# Patient Record
Sex: FEMALE | Race: BLACK OR AFRICAN AMERICAN | NOT HISPANIC OR LATINO | Employment: FULL TIME | URBAN - METROPOLITAN AREA
[De-identification: names, ages, dates, MRNs, and addresses within clinical notes are randomized per-mention and may not be internally consistent; named-entity substitution may affect disease eponyms.]

---

## 2020-08-20 ENCOUNTER — OFFICE VISIT (OUTPATIENT)
Dept: FAMILY MEDICINE | Facility: CLINIC | Age: 25
End: 2020-08-20
Payer: COMMERCIAL

## 2020-08-20 VITALS
RESPIRATION RATE: 16 BRPM | OXYGEN SATURATION: 97 % | TEMPERATURE: 98 F | SYSTOLIC BLOOD PRESSURE: 118 MMHG | DIASTOLIC BLOOD PRESSURE: 82 MMHG | HEART RATE: 84 BPM | BODY MASS INDEX: 27.12 KG/M2 | WEIGHT: 138.13 LBS | HEIGHT: 60 IN

## 2020-08-20 DIAGNOSIS — Z11.59 ENCOUNTER FOR HEPATITIS C SCREENING TEST FOR LOW RISK PATIENT: ICD-10-CM

## 2020-08-20 DIAGNOSIS — Z11.4 ENCOUNTER FOR SCREENING FOR HIV: ICD-10-CM

## 2020-08-20 DIAGNOSIS — L70.0 NODULAR ACNE: ICD-10-CM

## 2020-08-20 DIAGNOSIS — S76.112S QUADRICEPS MUSCLE RUPTURE, LEFT, SEQUELA: ICD-10-CM

## 2020-08-20 DIAGNOSIS — B35.3 TINEA PEDIS, UNSPECIFIED LATERALITY: ICD-10-CM

## 2020-08-20 DIAGNOSIS — R01.1 MURMUR: ICD-10-CM

## 2020-08-20 DIAGNOSIS — Z00.00 HEALTHCARE MAINTENANCE: ICD-10-CM

## 2020-08-20 DIAGNOSIS — R13.19 ESOPHAGEAL DYSPHAGIA: Primary | ICD-10-CM

## 2020-08-20 DIAGNOSIS — F41.1 GENERALIZED ANXIETY DISORDER: ICD-10-CM

## 2020-08-20 PROCEDURE — 99999 PR PBB SHADOW E&M-NEW PATIENT-LVL V: ICD-10-PCS | Mod: PBBFAC,,, | Performed by: INTERNAL MEDICINE

## 2020-08-20 PROCEDURE — 99999 PR PBB SHADOW E&M-NEW PATIENT-LVL V: CPT | Mod: PBBFAC,,, | Performed by: INTERNAL MEDICINE

## 2020-08-20 PROCEDURE — 99204 PR OFFICE/OUTPT VISIT, NEW, LEVL IV, 45-59 MIN: ICD-10-PCS | Mod: S$GLB,,, | Performed by: INTERNAL MEDICINE

## 2020-08-20 PROCEDURE — 99204 OFFICE O/P NEW MOD 45 MIN: CPT | Mod: S$GLB,,, | Performed by: INTERNAL MEDICINE

## 2020-08-20 RX ORDER — PANTOPRAZOLE SODIUM 20 MG/1
20 TABLET, DELAYED RELEASE ORAL DAILY
Qty: 90 TABLET | Refills: 1 | Status: SHIPPED | OUTPATIENT
Start: 2020-08-20 | End: 2021-08-20

## 2020-08-20 RX ORDER — CLINDAMYCIN PHOSPHATE 11.9 MG/ML
SOLUTION TOPICAL 2 TIMES DAILY
Qty: 30 ML | Refills: 3 | Status: SHIPPED | OUTPATIENT
Start: 2020-08-20 | End: 2023-06-08

## 2020-08-20 NOTE — ASSESSMENT & PLAN NOTE
Concerning for esopheal process: unclear if structural or motility, suspect psych component   protonix daily 30d  And ref to gi
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
## 2020-08-25 ENCOUNTER — TELEPHONE (OUTPATIENT)
Dept: FAMILY MEDICINE | Facility: CLINIC | Age: 25
End: 2020-08-25

## 2020-08-25 NOTE — TELEPHONE ENCOUNTER
----- Message from Rebecca Dey MD sent at 8/25/2020 12:53 PM CDT -----  Labs so far look ok  Small amount of blood in urine - is she on her period?  Thyroid number slightly abnormal nothing to do now, continue to monitor

## 2020-08-25 NOTE — TELEPHONE ENCOUNTER
Spoke with patient. Informed patient of recent lab results and Dr Dey's recommendations. Pt verbalizes understanding. Pt states that she was just ending her cycle, when doing the urine test.

## 2020-10-02 ENCOUNTER — TELEPHONE (OUTPATIENT)
Dept: FAMILY MEDICINE | Facility: CLINIC | Age: 25
End: 2020-10-02

## 2020-10-05 ENCOUNTER — OFFICE VISIT (OUTPATIENT)
Dept: DERMATOLOGY | Facility: CLINIC | Age: 25
End: 2020-10-05
Payer: COMMERCIAL

## 2020-10-05 DIAGNOSIS — L70.0 ACNE VULGARIS: ICD-10-CM

## 2020-10-05 DIAGNOSIS — L81.0 POST-INFLAMMATORY HYPERPIGMENTATION: Primary | ICD-10-CM

## 2020-10-05 PROCEDURE — 99999 PR PBB SHADOW E&M-EST. PATIENT-LVL III: CPT | Mod: PBBFAC,,, | Performed by: PHYSICIAN ASSISTANT

## 2020-10-05 PROCEDURE — 99202 OFFICE O/P NEW SF 15 MIN: CPT | Mod: S$GLB,,, | Performed by: PHYSICIAN ASSISTANT

## 2020-10-05 PROCEDURE — 99999 PR PBB SHADOW E&M-EST. PATIENT-LVL III: ICD-10-PCS | Mod: PBBFAC,,, | Performed by: PHYSICIAN ASSISTANT

## 2020-10-05 PROCEDURE — 99202 PR OFFICE/OUTPT VISIT, NEW, LEVL II, 15-29 MIN: ICD-10-PCS | Mod: S$GLB,,, | Performed by: PHYSICIAN ASSISTANT

## 2020-10-05 RX ORDER — TAZAROTENE 0.05 MG/G
CREAM CUTANEOUS
Qty: 30 G | Refills: 2 | Status: SHIPPED | OUTPATIENT
Start: 2020-10-05 | End: 2023-06-08

## 2020-10-05 RX ORDER — DOXYCYCLINE 100 MG/1
TABLET ORAL
Qty: 30 TABLET | Refills: 2 | Status: SHIPPED | OUTPATIENT
Start: 2020-10-05 | End: 2023-06-08

## 2020-10-05 RX ORDER — CLINDAMYCIN PHOSPHATE 10 MG/G
GEL TOPICAL
Qty: 30 G | Refills: 2 | Status: SHIPPED | OUTPATIENT
Start: 2020-10-05 | End: 2023-06-08

## 2020-10-05 NOTE — LETTER
October 5, 2020      Rebecca Dey MD  [address] [facility] [address]           [facility] - Dermatology 11th [address]  [address]  [place] [address]  Phone: [phone]  Fax: [phone]          Patient: Paula Campos   MR Number: [id number]   YOB: 1995   Date of Visit: 10/5/2020       Dear Dr. Rebecca Dey:    Thank you for referring Paula Campos to me for evaluation. Attached you will find relevant portions of my assessment and plan of care.    If you have questions, please do not hesitate to call me. I look forward to following Paula Campos along with you.    Sincerely,    Liz Sky PA-C    Enclosure  CC:  No Recipients    If you would like to receive this communication electronically, please contact [email or web address] or [phone] to request more information on [employer] access.    For providers and/or their staff who would like to refer a patient to [facility], please contact us through our one-stop-shop provider referral line, [facility], at [phone].    If you feel you have received this communication in error or would no longer like to receive these types of communications, please e-mail [email or web address]

## 2020-10-05 NOTE — PATIENT INSTRUCTIONS
Discussed benefits and risks of doxycyline therapy including but not limited to GI discomfort, esophageal irritation/ulceration, and increased sun sensitivity. Patient was counseled to take medicine with meals and at least 1 hour before lying down.     RETINOIDS           Your doctor has prescribed a topical retinoid for your skin. A retinoid is a vitamin A derived product used to treat a variety of skin conditions including acne, actinic keratoses (pre-skin cancers), uneven pigmentation from sun damage, fine lines and wrinkles, and enlarged pores.    How do they work?         Retinoids increase skin cell turn over from the normal 30 days to five or six days, minimizing clogged pores-the major factor in acne. Retinoids can also repair the DNA in cells damaged by the sun helping to even out skin pigmentation and clear pre-skin cancers. They can shrink oil glands and minimize the appearance of large pores. These effects can not be appreciated unless the medication is used on a consistent basis!    How do I use a retinoid?         After washing with a mild cleanser (Purpose, Aqua glycolic face cleanser, Cetaphil, Neutrogena deep cream cleanser), the skin should be moisturized with a non-retinol containing moisturizer such as Cerave PM. Then a thin layer of medication is applied to the forehead, nose cheeks, and chin (and around eyes if treating fine lines and wrinkles) at night. The amount of medication needed to cover the entire face should be no more than the size of a green pea. Irritation around the eyes can be treated with Vaseline at night.    What if my skin appears dry, red, and is peeling?          Retinoids do not cause dry skin but rather they cause the top layer of the skin the shed, giving an appearance of dry skin. In fact, new healthy skin cells are replacing older, damaged cells on the surface. This usually occurs the first 2-4 weeks as the skin is adjusting to the medication. It is reasonable to use  the medication every other night or even every 2 nights until your skin adjusts. You can use a MILD exfoliant to remove the peeling skin (Aveeno daily clarifying pads, Aqua glycolic face cream) and can apply a moisturizer throughout the day as needed. Retinoids come in a variety of strengths and vehicles and your doctor can find one best for you. If you cannot tolerate prescription strength retinoids, over the counter products with retinol may be beneficial. (Olay ProX wrinkle cream, FELIPE deep wrinkle cream, [facility])    Will my skin be more sensitive in the sun?           You will need to use sunscreen with SPF 30 daily. Retinoids will cause the outermost layer of the skin to be thinner and thus more sensitive to ultraviolet rays. However, remember that over time, retinoids actually make the skin thicker by enhancing collagen deposition which protects the skin from sun damage.    When will I see results?            If you are using a retinoid for acne, you should see improvement in 6-8 weeks. Do not be alarmed if you find that your acne gets worse before it gets better-KEEP USING THE MEDICATION- this is a normal response and your acne will improve if you can stick with it.           If you are using the medication for anti-aging and skin dyspigmentation, you may see results in 3 months, but most effects are not visible until 6 months. Retinoids are clinically proven to reverse signs of aging, but only if used on a CONSTISTENT BASIS!             Remember that retinoids should not be used if you are pregnant.           Discontinue use 1 week prior to waxing, as skin is more likely to tear.

## 2020-10-05 NOTE — PROGRESS NOTES
Subjective:       Patient ID:  Paula Campos is a 24 y.o. female who presents for   Chief Complaint   Patient presents with    Acne     Face, x 5yrs, hyperpigmented, tx otc product    Skin Discoloration     groin and back, x 10yr, hyperpgmented, tx otc products     Acne - Initial  Affected locations: face  Duration: 5 years  Signs / symptoms: redness (whitheads, blackheads, cystic)  Aggravated by: menses  Treatments tried: washes with a blackhead scrub 2-3 times per day, Dr. Boyd's soap on back.  Improvement on treatment: no relief        Review of Systems   Constitutional: Negative for fever and chills.   Genitourinary: Negative for irregular periods (not on ocp).   Skin: Negative for daily sunscreen use and activity-related sunscreen use.        Objective:    Physical Exam   Constitutional: She appears well-developed and well-nourished. No distress.   Neurological: She is alert and oriented to person, place, and time. She is not disoriented.   Psychiatric: She has a normal mood and affect.   Skin:   Areas Examined (abnormalities noted in diagram):   Head / Face Inspection Performed  Neck Inspection Performed  Chest / Axilla Inspection Performed  Back Inspection Performed  RUE Inspected  LUE Inspection Performed                   Diagram Legend     Erythematous scaling macule/papule c/w actinic keratosis       Vascular papule c/w angioma      Pigmented verrucoid papule/plaque c/w seborrheic keratosis      Yellow umbilicated papule c/w sebaceous hyperplasia      Irregularly shaped tan macule c/w lentigo     1-2 mm smooth white papules consistent with Milia      Movable subcutaneous cyst with punctum c/w epidermal inclusion cyst      Subcutaneous movable cyst c/w pilar cyst      Firm pink to brown papule c/w dermatofibroma      Pedunculated fleshy papule(s) c/w skin tag(s)      Evenly pigmented macule c/w junctional nevus     Mildly variegated pigmented, slightly irregular-bordered macule c/w mildly atypical  nevus      Flesh colored to evenly pigmented papule c/w intradermal nevus       Pink pearly papule/plaque c/w basal cell carcinoma      Erythematous hyperkeratotic cursted plaque c/w SCC      Surgical scar with no sign of skin cancer recurrence      Open and closed comedones      Inflammatory papules and pustules      Verrucoid papule consistent consistent with wart     Erythematous eczematous patches and plaques     Dystrophic onycholytic nail with subungual debris c/w onychomycosis     Umbilicated papule    Erythematous-base heme-crusted tan verrucoid plaque consistent with inflamed seborrheic keratosis     Erythematous Silvery Scaling Plaque c/w Psoriasis     See annotation      Assessment / Plan:      Acne vulgaris/ Post-inflammatory hyperpigmentation  -     clindamycin phosphate 1% (CLINDAGEL) 1 % gel; Apply to face qam.  Dispense: 30 g; Refill: 2  -     tazarotene (TAZORAC) 0.05 % Crea cream; Apply a pea-sized amt to face qohs x 2 wks then increase to qhs as tolerated.  Dispense: 30 g; Refill: 2  -     doxycycline monohydrate 100 mg Tab; Take 1 po qday  Dispense: 30 tablet; Refill: 2    Discussed benefits and risks of doxycyline therapy including but not limited to GI discomfort, esophageal irritation/ulceration, and increased sun sensitivity. Patient was counseled to take medicine with meals and at least 1 hour before lying down.     Notified patient of common potential side effects such as dryness, redness, peeling, or mild skin irritation. The patient was counseled to use a pea-sized amount prior to bedtime on the entire face. Start medication every other night until the patient develops tolerance, then increase to nightly use. The patient was encouraged to use gentle emollients in conjunction with this medication and temporarily hold medication if severe skin irritation occurs. The patient was told that retinoids are contraindicated in pregnancy. A retinoid brochure was provided.    Encourage daily spf of  at least 30 - samples given.  Wash face bid with CeraVe bpo cleanser.         Follow up in about 3 months (around 1/5/2021).

## 2020-11-16 ENCOUNTER — TELEPHONE (OUTPATIENT)
Dept: GASTROENTEROLOGY | Facility: CLINIC | Age: 25
End: 2020-11-16

## 2020-11-16 NOTE — TELEPHONE ENCOUNTER
11/16/2020 left message on patient voice mail to call office back to be reschedule for appointment with Dr. Jarvis. Patient did not show up for visit today. Vf/ma

## 2020-11-23 PROBLEM — Z00.00 HEALTHCARE MAINTENANCE: Status: RESOLVED | Noted: 2020-08-20 | Resolved: 2020-11-23

## 2021-05-06 ENCOUNTER — PATIENT MESSAGE (OUTPATIENT)
Dept: RESEARCH | Facility: HOSPITAL | Age: 26
End: 2021-05-06

## 2022-07-17 ENCOUNTER — OFFICE VISIT (OUTPATIENT)
Dept: URGENT CARE | Facility: CLINIC | Age: 27
End: 2022-07-17
Payer: COMMERCIAL

## 2022-07-17 VITALS
RESPIRATION RATE: 17 BRPM | BODY MASS INDEX: 22.18 KG/M2 | DIASTOLIC BLOOD PRESSURE: 76 MMHG | SYSTOLIC BLOOD PRESSURE: 120 MMHG | HEIGHT: 59 IN | WEIGHT: 110 LBS | HEART RATE: 73 BPM | OXYGEN SATURATION: 99 % | TEMPERATURE: 98 F

## 2022-07-17 DIAGNOSIS — M62.838 MUSCLE SPASM: ICD-10-CM

## 2022-07-17 DIAGNOSIS — R07.81 RIB PAIN ON LEFT SIDE: Primary | ICD-10-CM

## 2022-07-17 PROCEDURE — 99213 OFFICE O/P EST LOW 20 MIN: CPT | Mod: S$GLB,,, | Performed by: FAMILY MEDICINE

## 2022-07-17 PROCEDURE — 99213 PR OFFICE/OUTPT VISIT, EST, LEVL III, 20-29 MIN: ICD-10-PCS | Mod: S$GLB,,, | Performed by: FAMILY MEDICINE

## 2022-07-17 PROCEDURE — 71101 XR RIBS MIN 3 VIEWS W/ PA CHEST LEFT: ICD-10-PCS | Mod: FY,LT,S$GLB, | Performed by: RADIOLOGY

## 2022-07-17 PROCEDURE — 3008F PR BODY MASS INDEX (BMI) DOCUMENTED: ICD-10-PCS | Mod: CPTII,S$GLB,, | Performed by: FAMILY MEDICINE

## 2022-07-17 PROCEDURE — 1160F RVW MEDS BY RX/DR IN RCRD: CPT | Mod: CPTII,S$GLB,, | Performed by: FAMILY MEDICINE

## 2022-07-17 PROCEDURE — 71101 X-RAY EXAM UNILAT RIBS/CHEST: CPT | Mod: FY,LT,S$GLB, | Performed by: RADIOLOGY

## 2022-07-17 PROCEDURE — 3074F SYST BP LT 130 MM HG: CPT | Mod: CPTII,S$GLB,, | Performed by: FAMILY MEDICINE

## 2022-07-17 PROCEDURE — 3008F BODY MASS INDEX DOCD: CPT | Mod: CPTII,S$GLB,, | Performed by: FAMILY MEDICINE

## 2022-07-17 PROCEDURE — 1159F PR MEDICATION LIST DOCUMENTED IN MEDICAL RECORD: ICD-10-PCS | Mod: CPTII,S$GLB,, | Performed by: FAMILY MEDICINE

## 2022-07-17 PROCEDURE — 3078F DIAST BP <80 MM HG: CPT | Mod: CPTII,S$GLB,, | Performed by: FAMILY MEDICINE

## 2022-07-17 PROCEDURE — 3074F PR MOST RECENT SYSTOLIC BLOOD PRESSURE < 130 MM HG: ICD-10-PCS | Mod: CPTII,S$GLB,, | Performed by: FAMILY MEDICINE

## 2022-07-17 PROCEDURE — 3078F PR MOST RECENT DIASTOLIC BLOOD PRESSURE < 80 MM HG: ICD-10-PCS | Mod: CPTII,S$GLB,, | Performed by: FAMILY MEDICINE

## 2022-07-17 PROCEDURE — 1159F MED LIST DOCD IN RCRD: CPT | Mod: CPTII,S$GLB,, | Performed by: FAMILY MEDICINE

## 2022-07-17 PROCEDURE — 1160F PR REVIEW ALL MEDS BY PRESCRIBER/CLIN PHARMACIST DOCUMENTED: ICD-10-PCS | Mod: CPTII,S$GLB,, | Performed by: FAMILY MEDICINE

## 2022-07-17 RX ORDER — CYCLOBENZAPRINE HCL 10 MG
10 TABLET ORAL NIGHTLY
Qty: 7 TABLET | Refills: 0 | Status: SHIPPED | OUTPATIENT
Start: 2022-07-17 | End: 2022-07-24

## 2022-07-17 NOTE — PROGRESS NOTES
"Subjective:       Patient ID: Paula Campos is a 26 y.o. female.    Vitals:  height is 4' 11" (1.499 m) and weight is 49.9 kg (110 lb). Her temperature is 98.3 °F (36.8 °C). Her blood pressure is 120/76 and her pulse is 73. Her respiration is 17 and oxygen saturation is 99%.     Chief Complaint: Abdominal Pain    This is a 26 y.o. female who presents today with a chief complaint of burning sensation in left side of abdomen just underneath left rib cage x 2 weeks. Pt states she sneezed really hard and that's when the symptoms started.     Abdominal Pain  This is a new problem. The current episode started 1 to 4 weeks ago. The onset quality is sudden. The problem occurs intermittently. The problem has been gradually worsening. The pain is located in the left flank and LLQ. The pain is at a severity of 8/10. The pain is severe. The quality of the pain is burning and dull. The abdominal pain does not radiate. Pertinent negatives include no constipation, diarrhea, frequency, nausea or vomiting. Nothing aggravates the pain. The pain is relieved by nothing. She has tried nothing for the symptoms. There is no history of GERD or irritable bowel syndrome. Patient's medical history does not include kidney stones and UTI.       Constitution: Negative for activity change, appetite change, fatigue and generalized weakness.   Cardiovascular: Negative for chest pain, leg swelling and palpitations.   Respiratory: Negative for cough and shortness of breath.    Gastrointestinal: Positive for abdominal pain. Negative for nausea, vomiting, constipation and diarrhea.   Genitourinary: Negative for frequency.   Neurological: Negative for altered mental status.   Psychiatric/Behavioral: Negative for altered mental status, confusion and agitation.       Objective:       Vitals:    07/17/22 1614   BP: 120/76   Pulse: 73   Resp: 17   Temp: 98.3 °F (36.8 °C)   SpO2: 99%   Weight: 49.9 kg (110 lb)   Height: 4' 11" (1.499 m)     Physical Exam "   Constitutional: She is oriented to person, place, and time.  Non-toxic appearance. She does not appear ill. No distress.   HENT:   Head: Normocephalic and atraumatic.   Eyes: Conjunctivae are normal.   Neck: Neck supple.   Cardiovascular: Normal rate, regular rhythm, normal heart sounds and normal pulses.   Pulmonary/Chest: Effort normal and breath sounds normal. She exhibits tenderness.   Abdominal: Bowel sounds are normal. Soft. There is no rebound.   Musculoskeletal:         General: No swelling, tenderness or deformity.   Neurological: She is alert and oriented to person, place, and time.   Skin: Skin is not diaphoretic and no rash.   Psychiatric: Judgment and thought content normal.         Assessment:       1. Rib pain on left side    2. Muscle spasm          Plan:         1. Rib pain on left side  -     XR RIB LEFT W/ PA CHEST; Future; Expected date: 07/17/2022  I have independently reviewed and interpreted the x-ray which shows no abnormality of lung fields visualized, no rib fracture or dislocation.   Patient is not sexually active with male partner- denies any chance of pregnancy. No pregnancy test ordered prior to X-ray.    2. Muscle spasm  -     cyclobenzaprine (FLEXERIL) 10 MG tablet; Take 1 tablet (10 mg total) by mouth every evening. for 7 days  Dispense: 7 tablet; Refill: 0    Patient Instructions   There are no concern on Xray of left rib.  7 day trial of Flexeril; take at bedtime; this medication could cause drowsiness  Follow up with primary care provider/ urgent care if no improvement or worsening within 7 days.    Disclaimer: This note has been generated using voice-recognition software. There may be typographical errors that have been missed during proof-reading

## 2022-07-17 NOTE — PATIENT INSTRUCTIONS
There are no concerns on Xray of left rib.  7 day trial of Flexeril; take at bedtime; this medication could cause drowsiness  Follow up with primary care provider/ urgent care if no improvement or worsening within 7 days.

## 2022-07-19 ENCOUNTER — HOSPITAL ENCOUNTER (EMERGENCY)
Facility: HOSPITAL | Age: 27
Discharge: HOME | End: 2022-07-19
Attending: EMERGENCY MEDICINE
Payer: COMMERCIAL

## 2022-07-19 VITALS
OXYGEN SATURATION: 99 % | SYSTOLIC BLOOD PRESSURE: 134 MMHG | TEMPERATURE: 98 F | BODY MASS INDEX: 22.18 KG/M2 | WEIGHT: 110 LBS | DIASTOLIC BLOOD PRESSURE: 74 MMHG | HEART RATE: 96 BPM | HEIGHT: 59 IN | RESPIRATION RATE: 18 BRPM

## 2022-07-19 DIAGNOSIS — R10.10 PAIN OF UPPER ABDOMEN: Primary | ICD-10-CM

## 2022-07-19 LAB
ALBUMIN SERPL BCP-MCNC: 4.7 G/DL (ref 3.5–5.2)
ALP SERPL-CCNC: 39 U/L (ref 55–135)
ALT SERPL W/O P-5'-P-CCNC: 9 U/L (ref 10–44)
ANION GAP SERPL CALC-SCNC: 10 MMOL/L (ref 8–16)
AST SERPL-CCNC: 19 U/L (ref 10–40)
B-HCG UR QL: NEGATIVE
BASOPHILS # BLD AUTO: 0.02 K/UL (ref 0–0.2)
BASOPHILS NFR BLD: 0.3 % (ref 0–1.9)
BILIRUB SERPL-MCNC: 0.9 MG/DL (ref 0.1–1)
BILIRUB UR QL STRIP: NEGATIVE
BUN SERPL-MCNC: 9 MG/DL (ref 6–20)
CALCIUM SERPL-MCNC: 9.9 MG/DL (ref 8.7–10.5)
CHLORIDE SERPL-SCNC: 104 MMOL/L (ref 95–110)
CLARITY UR REFRACT.AUTO: CLEAR
CO2 SERPL-SCNC: 25 MMOL/L (ref 23–29)
COLOR UR AUTO: ABNORMAL
CREAT SERPL-MCNC: 0.8 MG/DL (ref 0.5–1.4)
CTP QC/QA: YES
DIFFERENTIAL METHOD: ABNORMAL
EOSINOPHIL # BLD AUTO: 0 K/UL (ref 0–0.5)
EOSINOPHIL NFR BLD: 0.2 % (ref 0–8)
ERYTHROCYTE [DISTWIDTH] IN BLOOD BY AUTOMATED COUNT: 12.2 % (ref 11.5–14.5)
EST. GFR  (AFRICAN AMERICAN): >60 ML/MIN/1.73 M^2
EST. GFR  (NON AFRICAN AMERICAN): >60 ML/MIN/1.73 M^2
GLUCOSE SERPL-MCNC: 119 MG/DL (ref 70–110)
GLUCOSE UR QL STRIP: NEGATIVE
HCT VFR BLD AUTO: 42.5 % (ref 37–48.5)
HCV AB SERPL QL IA: NEGATIVE
HGB BLD-MCNC: 13.8 G/DL (ref 12–16)
HGB UR QL STRIP: ABNORMAL
HIV 1+2 AB+HIV1 P24 AG SERPL QL IA: NEGATIVE
IMM GRANULOCYTES # BLD AUTO: 0.02 K/UL (ref 0–0.04)
IMM GRANULOCYTES NFR BLD AUTO: 0.3 % (ref 0–0.5)
KETONES UR QL STRIP: ABNORMAL
LEUKOCYTE ESTERASE UR QL STRIP: NEGATIVE
LIPASE SERPL-CCNC: 17 U/L (ref 4–60)
LYMPHOCYTES # BLD AUTO: 0.6 K/UL (ref 1–4.8)
LYMPHOCYTES NFR BLD: 10.3 % (ref 18–48)
MCH RBC QN AUTO: 31.7 PG (ref 27–31)
MCHC RBC AUTO-ENTMCNC: 32.5 G/DL (ref 32–36)
MCV RBC AUTO: 98 FL (ref 82–98)
MICROSCOPIC COMMENT: NORMAL
MONOCYTES # BLD AUTO: 0.3 K/UL (ref 0.3–1)
MONOCYTES NFR BLD: 4.7 % (ref 4–15)
NEUTROPHILS # BLD AUTO: 5 K/UL (ref 1.8–7.7)
NEUTROPHILS NFR BLD: 84.2 % (ref 38–73)
NITRITE UR QL STRIP: NEGATIVE
NRBC BLD-RTO: 0 /100 WBC
PH UR STRIP: 5 [PH] (ref 5–8)
PLATELET # BLD AUTO: 258 K/UL (ref 150–450)
PMV BLD AUTO: 10.9 FL (ref 9.2–12.9)
POTASSIUM SERPL-SCNC: 3.4 MMOL/L (ref 3.5–5.1)
PROT SERPL-MCNC: 7.9 G/DL (ref 6–8.4)
PROT UR QL STRIP: NEGATIVE
RBC # BLD AUTO: 4.36 M/UL (ref 4–5.4)
RBC #/AREA URNS AUTO: 0 /HPF (ref 0–4)
SODIUM SERPL-SCNC: 139 MMOL/L (ref 136–145)
SP GR UR STRIP: 1.01 (ref 1–1.03)
SQUAMOUS #/AREA URNS AUTO: 1 /HPF
URN SPEC COLLECT METH UR: ABNORMAL
WBC # BLD AUTO: 5.94 K/UL (ref 3.9–12.7)
WBC #/AREA URNS AUTO: 0 /HPF (ref 0–5)

## 2022-07-19 PROCEDURE — 80053 COMPREHEN METABOLIC PANEL: CPT | Performed by: EMERGENCY MEDICINE

## 2022-07-19 PROCEDURE — 81001 URINALYSIS AUTO W/SCOPE: CPT | Performed by: EMERGENCY MEDICINE

## 2022-07-19 PROCEDURE — 99284 EMERGENCY DEPT VISIT MOD MDM: CPT | Mod: ,,, | Performed by: EMERGENCY MEDICINE

## 2022-07-19 PROCEDURE — 99284 PR EMERGENCY DEPT VISIT,LEVEL IV: ICD-10-PCS | Mod: ,,, | Performed by: EMERGENCY MEDICINE

## 2022-07-19 PROCEDURE — 87389 HIV-1 AG W/HIV-1&-2 AB AG IA: CPT | Performed by: PHYSICIAN ASSISTANT

## 2022-07-19 PROCEDURE — 86803 HEPATITIS C AB TEST: CPT | Performed by: PHYSICIAN ASSISTANT

## 2022-07-19 PROCEDURE — 25000003 PHARM REV CODE 250: Performed by: EMERGENCY MEDICINE

## 2022-07-19 PROCEDURE — 81025 URINE PREGNANCY TEST: CPT | Performed by: EMERGENCY MEDICINE

## 2022-07-19 PROCEDURE — 83690 ASSAY OF LIPASE: CPT | Performed by: EMERGENCY MEDICINE

## 2022-07-19 PROCEDURE — 85025 COMPLETE CBC W/AUTO DIFF WBC: CPT | Performed by: EMERGENCY MEDICINE

## 2022-07-19 PROCEDURE — 99285 EMERGENCY DEPT VISIT HI MDM: CPT | Mod: 25

## 2022-07-19 RX ORDER — FAMOTIDINE 20 MG/1
20 TABLET, FILM COATED ORAL
Status: COMPLETED | OUTPATIENT
Start: 2022-07-19 | End: 2022-07-19

## 2022-07-19 RX ORDER — MAG HYDROX/ALUMINUM HYD/SIMETH 200-200-20
5 SUSPENSION, ORAL (FINAL DOSE FORM) ORAL
Status: COMPLETED | OUTPATIENT
Start: 2022-07-19 | End: 2022-07-19

## 2022-07-19 RX ORDER — ACETAMINOPHEN 500 MG
1000 TABLET ORAL
Status: COMPLETED | OUTPATIENT
Start: 2022-07-19 | End: 2022-07-19

## 2022-07-19 RX ADMIN — ACETAMINOPHEN 1000 MG: 500 TABLET ORAL at 12:07

## 2022-07-19 RX ADMIN — FAMOTIDINE 20 MG: 20 TABLET ORAL at 12:07

## 2022-07-19 RX ADMIN — ALUMINUM HYDROXIDE, MAGNESIUM HYDROXIDE, AND SIMETHICONE 5 ML: 200; 200; 20 SUSPENSION ORAL at 12:07

## 2022-07-19 NOTE — ED PROVIDER NOTES
Encounter Date: 7/19/2022       History     Chief Complaint   Patient presents with    Abdominal Pain     L upper abd , vomiting , seen at  and told nothing with ribs     27 y/o f, healthy, c/o LUQ pain x 2 weeks, thinks it may have been triggered by sneezing/coughing episode though she's not entirely sure.  Since then pain has been persistent, moderate.  This am, had episodes of vomiting though she's unsure if that is related to underlying anxiety.  No fever/chills.  No flank pain.  Seen in  2 days ago, had rib series done that was unremarkable.    The history is provided by the patient.     Review of patient's allergies indicates:  No Known Allergies  No past medical history on file.  No past surgical history on file.  No family history on file.  Social History     Tobacco Use    Smoking status: Current Every Day Smoker     Types: Cigarettes    Smokeless tobacco: Never Used   Substance Use Topics    Alcohol use: No    Drug use: Yes     Types: Marijuana     Review of Systems   Constitutional: Negative for chills and fever.   Respiratory: Negative for cough, chest tightness and shortness of breath.    Gastrointestinal: Positive for abdominal pain, nausea and vomiting. Negative for diarrhea.   Genitourinary: Negative for difficulty urinating and hematuria.       Physical Exam     Initial Vitals [07/19/22 1227]   BP Pulse Resp Temp SpO2   134/74 96 18 98.3 °F (36.8 °C) 99 %      MAP       --         Physical Exam    Nursing note and vitals reviewed.  Constitutional: Vital signs are normal. She appears well-developed and well-nourished. She is not diaphoretic.  Non-toxic appearance. She does not appear ill. No distress.   HENT:   Head: Normocephalic and atraumatic.   Mouth/Throat: Mucous membranes are normal. Mucous membranes are not dry.   Eyes: Conjunctivae and lids are normal.   Neck: Neck supple.   Normal range of motion.  Cardiovascular: Normal rate.   Pulmonary/Chest: No respiratory distress.    Abdominal: Abdomen is soft. She exhibits no distension. There is no abdominal tenderness. There is no rebound and no guarding.   Musculoskeletal:         General: No edema.      Cervical back: Normal range of motion and neck supple.     Neurological: She is alert and oriented to person, place, and time.   Skin: Skin is warm, dry and intact. No rash noted. No pallor.   Psychiatric: She has a normal mood and affect. Her speech is normal and behavior is normal.         ED Course   Procedures  Labs Reviewed   COMPREHENSIVE METABOLIC PANEL - Abnormal; Notable for the following components:       Result Value    Potassium 3.4 (*)     Glucose 119 (*)     Alkaline Phosphatase 39 (*)     ALT 9 (*)     All other components within normal limits    Narrative:     Release to patient->Immediate   CBC W/ AUTO DIFFERENTIAL - Abnormal; Notable for the following components:    MCH 31.7 (*)     Lymph # 0.6 (*)     Gran % 84.2 (*)     Lymph % 10.3 (*)     All other components within normal limits    Narrative:     Release to patient->Immediate   URINALYSIS, REFLEX TO URINE CULTURE - Abnormal; Notable for the following components:    Ketones, UA Trace (*)     Occult Blood UA 1+ (*)     All other components within normal limits    Narrative:     Specimen Source->Urine   HIV 1 / 2 ANTIBODY    Narrative:     Release to patient->Immediate   HEPATITIS C ANTIBODY    Narrative:     Release to patient->Immediate   LIPASE    Narrative:     Release to patient->Immediate   URINALYSIS MICROSCOPIC    Narrative:     Specimen Source->Urine   POCT URINE PREGNANCY          Imaging Results          CT Renal Stone Study ABD Pelvis WO (Final result)  Result time 07/19/22 14:17:56    Final result by Moises Lamar MD (07/19/22 14:17:56)                 Impression:      1. Hepatomegaly, correlation with LFTs recommended.  2. No findings to suggest obstructive uropathy.  3. Several additional findings above.      Electronically signed by: Moises Lamar  MD  Date:    07/19/2022  Time:    14:17             Narrative:    EXAMINATION:  CT RENAL STONE STUDY ABD PELVIS WO    CLINICAL HISTORY:  Flank pain, kidney stone suspected;    TECHNIQUE:  Low dose axial images, sagittal and coronal reformations were obtained from the lung bases to the pubic symphysis.  Contrast was not administered.    COMPARISON:  None    FINDINGS:  Images of the lower thorax are unremarkable.    The liver is enlarged, correlation with LFTs recommended.  The spleen, pancreas, and adrenal glands have a grossly unremarkable noncontrast appearance.  There is high attenuating material layering posteriorly within the gallbladder suggesting sludge.  The stomach is decompressed without wall thickening.  No significant abdominal lymphadenopathy.    The kidneys have a grossly unremarkable noncontrast appearance without hydronephrosis or nephrolithiasis.  The bilateral ureters are unable to be followed in their entirety to the urinary bladder, no definite calculi seen or secondary findings to suggest obstructive uropathy.  The urinary bladder is unremarkable.  The uterus and adnexa are unremarkable.  No significant free fluid in the pelvis.    The large bowel is for the most part decompressed.  The terminal ileum and appendix are unremarkable.  The small bowel is grossly unremarkable.  No significant abdominal lymphadenopathy.    No acute osseous abnormality.  No significant inguinal lymphadenopathy.                                 Medications   famotidine tablet 20 mg (20 mg Oral Given 7/19/22 1254)   aluminum-magnesium hydroxide-simethicone 200-200-20 mg/5 mL suspension 5 mL (5 mLs Oral Given 7/19/22 1252)   acetaminophen tablet 1,000 mg (1,000 mg Oral Given 7/19/22 1252)     Medical Decision Making:   History:   Old Medical Records: I decided to obtain old medical records.  Initial Assessment:   VSS  Well-appearing clinically  abd exam benign  Differential Diagnosis:   DDx would include ureteral stone  with hydronephrosis, pyelonephritis, retroperitoneal hemorrhage, aortic dissection, biliary colic, musculoskeletal back pain, vertebral compression fracture, lower lobe pneumonia and pre-zoster syndrome.    Clinical Tests:   Lab Tests: Ordered and Reviewed  Radiological Study: Reviewed and Ordered                      Clinical Impression:   Final diagnoses:  [R10.10] Pain of upper abdomen (Primary)          ED Disposition Condition    Discharge Stable        ED Prescriptions     None        Follow-up Information     Follow up With Specialties Details Why Contact Info Additional Information    [facility] - Emergency Dept Emergency Medicine  If symptoms worsen [address] [id number] [phone]     [facility] Internal Medicine Schedule an appointment as soon as possible for a visit   [address] [phone] [facility] Please park in surface lot and check in at central registration desk           Nicole Araujo MD  07/19/22 4861

## 2022-07-21 ENCOUNTER — OFFICE VISIT (OUTPATIENT)
Dept: FAMILY MEDICINE | Facility: HOSPITAL | Age: 27
End: 2022-07-21
Attending: FAMILY MEDICINE
Payer: COMMERCIAL

## 2022-07-21 VITALS
WEIGHT: 110.25 LBS | HEART RATE: 72 BPM | BODY MASS INDEX: 22.23 KG/M2 | HEIGHT: 59 IN | DIASTOLIC BLOOD PRESSURE: 77 MMHG | SYSTOLIC BLOOD PRESSURE: 109 MMHG

## 2022-07-21 DIAGNOSIS — R07.81 RIB PAIN ON LEFT SIDE: ICD-10-CM

## 2022-07-21 DIAGNOSIS — R10.12 LEFT UPPER QUADRANT ABDOMINAL PAIN: ICD-10-CM

## 2022-07-21 DIAGNOSIS — F41.9 ANXIETY: Primary | ICD-10-CM

## 2022-07-21 PROCEDURE — 99214 OFFICE O/P EST MOD 30 MIN: CPT | Performed by: STUDENT IN AN ORGANIZED HEALTH CARE EDUCATION/TRAINING PROGRAM

## 2022-07-21 RX ORDER — IBUPROFEN 800 MG/1
800 TABLET ORAL 3 TIMES DAILY PRN
Qty: 30 TABLET | Refills: 1 | Status: SHIPPED | OUTPATIENT
Start: 2022-07-21 | End: 2023-04-20

## 2022-07-21 RX ORDER — ESCITALOPRAM OXALATE 10 MG/1
10 TABLET ORAL DAILY
Qty: 30 TABLET | Refills: 11 | Status: SHIPPED | OUTPATIENT
Start: 2022-07-21 | End: 2023-07-21

## 2022-07-22 PROBLEM — R07.81 RIB PAIN ON LEFT SIDE: Status: ACTIVE | Noted: 2022-07-22

## 2022-07-23 ENCOUNTER — TELEPHONE (OUTPATIENT)
Dept: FAMILY MEDICINE | Facility: HOSPITAL | Age: 27
End: 2022-07-23
Payer: COMMERCIAL

## 2022-07-23 NOTE — TELEPHONE ENCOUNTER
Called patient in regards to anxiety and manifesting physical symptoms. Patient reports that whenever she feels anxious her GI issues/ abdominal pain gets worse. Patient reports that after resolution of argument with partner, symptoms resolved. Reassured patient that symptoms are likely 2/2 to anxiety. Patient reports taking Lexapro prn. Encouraged her to take it Daily and educated her on the pharmacokinetics of SSRI's. Patient verbalized understanding. Has an appointment scheduled soon for anxiety follow-up.    Fady Schafer MD  [facility] Family Medicine, PGY-2  1:59 PM, 07/23/2022         ----- Message from Jaclyn Rutledge LPN sent at 7/22/2022 11:45 AM CDT -----    ----- Message -----  From: Zohra Soto  Sent: 7/22/2022  10:37 AM CDT  To: Gilmar Shrestha Staff    Pt requesting a call back in reference to her symptoms getting worse. Please call to [phone]

## 2022-07-26 ENCOUNTER — TELEPHONE (OUTPATIENT)
Dept: FAMILY MEDICINE | Facility: HOSPITAL | Age: 27
End: 2022-07-26
Payer: COMMERCIAL

## 2022-07-26 NOTE — TELEPHONE ENCOUNTER
Called patient to address message received. She did not , left voice mail. Recently spoke to patient this weekend who said symptoms improved. Offered reassurance. Patient ultimately will have to use Lexapro consistently (was not adherent until yesterday). She will also benefit from establish with psychology. Patient has an appointment with me soon for anxiety follow-up. Will re-eval then.    Fady Schafer MD  [facility] Family [facility], PGY-2  10:07 AM, 07/26/2022       ----- Message from Jaclyn Rutledge LPN sent at 7/25/2022 10:07 AM CDT -----    ----- Message -----  From: Zohra Soto  Sent: 7/25/2022   9:44 AM CDT  To: [facility] Shrestha Staff    [phone]. Pl;ease call to advise as pt states that she is getting worse. Pt wpould really like to speak with the doctor. States she has lost 5lbs since seeing him

## 2022-07-28 NOTE — PROGRESS NOTES
I assume primary medical responsibility for this patient. I have reviewed the history, physical, and assessement & treatment plan with the resident and agree that the care is reasonable and necessary. This service has been performed by a resident without the presence of a teaching physician under the primary care exception. If necessary, an addendum of additional findings or evaluation beyond the resident documentation will be noted below.     Apurva Parra MD

## 2022-10-18 ENCOUNTER — OFFICE VISIT (OUTPATIENT)
Dept: URGENT CARE | Facility: CLINIC | Age: 27
End: 2022-10-18
Payer: COMMERCIAL

## 2022-10-18 VITALS
SYSTOLIC BLOOD PRESSURE: 117 MMHG | TEMPERATURE: 99 F | OXYGEN SATURATION: 95 % | HEART RATE: 103 BPM | WEIGHT: 110 LBS | RESPIRATION RATE: 16 BRPM | HEIGHT: 59 IN | DIASTOLIC BLOOD PRESSURE: 72 MMHG | BODY MASS INDEX: 22.18 KG/M2

## 2022-10-18 DIAGNOSIS — R10.9 ABDOMINAL PAIN, UNSPECIFIED ABDOMINAL LOCATION: Primary | ICD-10-CM

## 2022-10-18 LAB
B-HCG UR QL: NEGATIVE
BILIRUB UR QL STRIP: NEGATIVE
CTP QC/QA: YES
GLUCOSE UR QL STRIP: NEGATIVE
KETONES UR QL STRIP: POSITIVE
LEUKOCYTE ESTERASE UR QL STRIP: NEGATIVE
PH, POC UA: 5 (ref 5–8)
POC BLOOD, URINE: POSITIVE
POC NITRATES, URINE: NEGATIVE
PROT UR QL STRIP: NEGATIVE
SP GR UR STRIP: 1.02 (ref 1–1.03)
UROBILINOGEN UR STRIP-ACNC: NORMAL (ref 0.1–1.1)

## 2022-10-18 PROCEDURE — 99204 OFFICE O/P NEW MOD 45 MIN: CPT | Mod: S$GLB,,, | Performed by: NURSE PRACTITIONER

## 2022-10-18 PROCEDURE — 1160F RVW MEDS BY RX/DR IN RCRD: CPT | Mod: CPTII,S$GLB,, | Performed by: NURSE PRACTITIONER

## 2022-10-18 PROCEDURE — 74019 XR ABDOMEN FLAT AND ERECT: ICD-10-PCS | Mod: FY,S$GLB,, | Performed by: RADIOLOGY

## 2022-10-18 PROCEDURE — 1159F PR MEDICATION LIST DOCUMENTED IN MEDICAL RECORD: ICD-10-PCS | Mod: CPTII,S$GLB,, | Performed by: NURSE PRACTITIONER

## 2022-10-18 PROCEDURE — 3074F PR MOST RECENT SYSTOLIC BLOOD PRESSURE < 130 MM HG: ICD-10-PCS | Mod: CPTII,S$GLB,, | Performed by: NURSE PRACTITIONER

## 2022-10-18 PROCEDURE — 1159F MED LIST DOCD IN RCRD: CPT | Mod: CPTII,S$GLB,, | Performed by: NURSE PRACTITIONER

## 2022-10-18 PROCEDURE — 74019 RADEX ABDOMEN 2 VIEWS: CPT | Mod: FY,S$GLB,, | Performed by: RADIOLOGY

## 2022-10-18 PROCEDURE — 81003 POCT URINALYSIS, DIPSTICK, AUTOMATED, W/O SCOPE: ICD-10-PCS | Mod: QW,S$GLB,, | Performed by: NURSE PRACTITIONER

## 2022-10-18 PROCEDURE — 3078F PR MOST RECENT DIASTOLIC BLOOD PRESSURE < 80 MM HG: ICD-10-PCS | Mod: CPTII,S$GLB,, | Performed by: NURSE PRACTITIONER

## 2022-10-18 PROCEDURE — 81025 URINE PREGNANCY TEST: CPT | Mod: S$GLB,,, | Performed by: NURSE PRACTITIONER

## 2022-10-18 PROCEDURE — 99204 PR OFFICE/OUTPT VISIT, NEW, LEVL IV, 45-59 MIN: ICD-10-PCS | Mod: S$GLB,,, | Performed by: NURSE PRACTITIONER

## 2022-10-18 PROCEDURE — 1160F PR REVIEW ALL MEDS BY PRESCRIBER/CLIN PHARMACIST DOCUMENTED: ICD-10-PCS | Mod: CPTII,S$GLB,, | Performed by: NURSE PRACTITIONER

## 2022-10-18 PROCEDURE — 3078F DIAST BP <80 MM HG: CPT | Mod: CPTII,S$GLB,, | Performed by: NURSE PRACTITIONER

## 2022-10-18 PROCEDURE — 3074F SYST BP LT 130 MM HG: CPT | Mod: CPTII,S$GLB,, | Performed by: NURSE PRACTITIONER

## 2022-10-18 PROCEDURE — 81003 URINALYSIS AUTO W/O SCOPE: CPT | Mod: QW,S$GLB,, | Performed by: NURSE PRACTITIONER

## 2022-10-18 PROCEDURE — 81025 POCT URINE PREGNANCY: ICD-10-PCS | Mod: S$GLB,,, | Performed by: NURSE PRACTITIONER

## 2022-10-18 RX ORDER — DICYCLOMINE HYDROCHLORIDE 20 MG/1
20 TABLET ORAL
Qty: 21 TABLET | Refills: 0 | Status: SHIPPED | OUTPATIENT
Start: 2022-10-18

## 2022-10-19 NOTE — PATIENT INSTRUCTIONS
Abdominal Pain   If your condition worsens or fails to improve we recommend that you receive another evaluation at the ER immediately or contact your PCP to discuss your concerns or return here. You must understand that you've received an urgent care treatment only and that you may be released before all your medical problems are known or treated. You the patient will arrange for followup care as instructed.   Watch for any increase pain, fever, localized pain to right lower abdomen or continued vomiting or diarrhea.   Bentyl as needed for cramps/spasms in stomach.

## 2022-10-19 NOTE — PROGRESS NOTES
"Subjective:       Patient ID: Paula Campos is a 27 y.o. female.    Vitals:  height is 4' 11" (1.499 m) and weight is 49.9 kg (110 lb). Her oral temperature is 98.6 °F (37 °C). Her blood pressure is 117/72 and her pulse is 103. Her respiration is 16 and oxygen saturation is 95%.     Chief Complaint: Abdominal Pain    Patient presents with c/o abdominal pain that was lower but now is upper and lower.  She notes that she started with this yesterday and today.  She is going to start her menstrual cycle in approximately 3 days she notes.  She says the same thing happened three months ago, she went to an UC and the ER.  She had a CT done at that time which showed enlarged liver, otherwise unremarkable.  She has not followed up for this.  Last BM today.  Denies hard stools.  She has associated bloating.  Denies hematuria, flank pain, fever.  Denies n/v/d.  She feels like something is inside her stomach and "kicking" her.      Abdominal Pain  This is a new problem. The current episode started yesterday. The onset quality is undetermined. The problem occurs intermittently. The most recent episode lasted 2 days. The problem has been gradually worsening. The pain is located in the generalized abdominal region. The pain is at a severity of 7/10. The pain is moderate. The quality of the pain is cramping and aching. The abdominal pain does not radiate. Pertinent negatives include no anorexia, arthralgias, belching, constipation, diarrhea, dysuria, fever, flatus, frequency, headaches, hematochezia, hematuria, melena, myalgias, nausea, vomiting or weight loss. Nothing aggravates the pain. Treatments tried: ibuprofen. The treatment provided no relief.     Constitution: Negative for chills, sweating, fatigue and fever.   Gastrointestinal:  Positive for abdominal pain. Negative for nausea, vomiting, constipation, diarrhea and bright red blood in stool.   Genitourinary:  Negative for dysuria, frequency, urgency, flank pain, hematuria, " history of kidney stones, ovarian cysts and pelvic pain.   Musculoskeletal:  Negative for joint pain and muscle ache.   Neurological:  Negative for headaches.     Objective:      Physical Exam   Constitutional: She is oriented to person, place, and time. She appears well-developed.  Non-toxic appearance. She does not appear ill. No distress.   HENT:   Head: Normocephalic and atraumatic.   Ears:   Right Ear: External ear normal.   Left Ear: External ear normal.   Nose: Nose normal.   Mouth/Throat: Mucous membranes are normal.   Eyes: Conjunctivae and lids are normal.   Neck: Trachea normal. Neck supple.   Cardiovascular: Normal rate, regular rhythm and normal heart sounds.   Pulmonary/Chest: Effort normal and breath sounds normal. No respiratory distress.   Abdominal: Normal appearance and bowel sounds are normal. She exhibits no distension and no mass. Soft. flat abdomen There is no abdominal tenderness. There is no rebound, no guarding, no left CVA tenderness and no right CVA tenderness. No hernia.   Musculoskeletal: Normal range of motion.         General: Normal range of motion.   Neurological: She is alert and oriented to person, place, and time. She has normal strength.   Skin: Skin is warm, dry, intact, not diaphoretic and not pale.   Psychiatric: Her speech is normal and behavior is normal. Judgment and thought content normal.   Nursing note and vitals reviewed.      Results for orders placed or performed in visit on 10/18/22   POCT Urinalysis, Dipstick, Automated, W/O Scope   Result Value Ref Range    POC Blood, Urine Positive (A) Negative    POC Bilirubin, Urine Negative Negative    POC Urobilinogen, Urine normal 0.1 - 1.1    POC Ketones, Urine Positive (A) Negative    POC Protein, Urine Negative Negative    POC Nitrates, Urine Negative Negative    POC Glucose, Urine Negative Negative    pH, UA 5.0 5 - 8    POC Specific Gravity, Urine 1.025 1.003 - 1.029    POC Leukocytes, Urine Negative Negative   POCT  urine pregnancy   Result Value Ref Range    POC Preg Test, Ur Negative Negative     Acceptable Yes      X-Ray Abdomen Flat And Erect    Result Date: 10/18/2022  EXAMINATION: XR ABDOMEN FLAT AND ERECT CLINICAL HISTORY: Unspecified abdominal pain TECHNIQUE: Flat and erect AP views of the abdomen were performed. COMPARISON: CT abdomen and pelvis from 07/19/2022. FINDINGS: There is a normal, nonobstructive bowel gas pattern. Free air cannot be excluded on this supine radiograph. There is no abnormal calcification. The visualized osseous structures are unremarkable. The visualized lung bases are clear.     No acute abnormality. Electronically signed by: Solomon Scott Date:    10/18/2022 Time:    20:03      Assessment:       1. Abdominal pain, unspecified abdominal location            Plan:       UA and UPT negative.  CT from 3 months ago reviewed and reviewed with patient.  Xray reviewed.  Bentyl sent to pharmacy.  Offered referral for PCP but she has one that she says she can follow up with closely for recurrent symptoms.    Strict ER precautions given.  Abdominal pain, unspecified abdominal location  -     POCT Urinalysis, Dipstick, Automated, W/O Scope  -     POCT urine pregnancy  -     X-Ray Abdomen Flat And Erect; Future; Expected date: 10/18/2022           Patient Instructions                                                           Abdominal Pain   If your condition worsens or fails to improve we recommend that you receive another evaluation at the ER immediately or contact your PCP to discuss your concerns or return here. You must understand that you've received an urgent care treatment only and that you may be released before all your medical problems are known or treated. You the patient will arrange for followup care as instructed.   Watch for any increase pain, fever, localized pain to right lower abdomen or continued vomiting or diarrhea.   Bentyl as needed for cramps/spasms in stomach.

## 2022-11-03 ENCOUNTER — OFFICE VISIT (OUTPATIENT)
Dept: OBSTETRICS AND GYNECOLOGY | Facility: CLINIC | Age: 27
End: 2022-11-03
Payer: COMMERCIAL

## 2022-11-03 VITALS
DIASTOLIC BLOOD PRESSURE: 66 MMHG | WEIGHT: 116.19 LBS | SYSTOLIC BLOOD PRESSURE: 120 MMHG | HEIGHT: 60 IN | BODY MASS INDEX: 22.81 KG/M2

## 2022-11-03 DIAGNOSIS — Z01.419 WELL WOMAN EXAM WITH ROUTINE GYNECOLOGICAL EXAM: Primary | ICD-10-CM

## 2022-11-03 PROCEDURE — 99999 PR PBB SHADOW E&M-EST. PATIENT-LVL III: ICD-10-PCS | Mod: PBBFAC,,, | Performed by: OBSTETRICS & GYNECOLOGY

## 2022-11-03 PROCEDURE — 87624 HPV HI-RISK TYP POOLED RSLT: CPT | Performed by: OBSTETRICS & GYNECOLOGY

## 2022-11-03 PROCEDURE — 99385 PREV VISIT NEW AGE 18-39: CPT | Mod: S$GLB,,, | Performed by: OBSTETRICS & GYNECOLOGY

## 2022-11-03 PROCEDURE — 1159F MED LIST DOCD IN RCRD: CPT | Mod: CPTII,S$GLB,, | Performed by: OBSTETRICS & GYNECOLOGY

## 2022-11-03 PROCEDURE — 3074F SYST BP LT 130 MM HG: CPT | Mod: CPTII,S$GLB,, | Performed by: OBSTETRICS & GYNECOLOGY

## 2022-11-03 PROCEDURE — 1159F PR MEDICATION LIST DOCUMENTED IN MEDICAL RECORD: ICD-10-PCS | Mod: CPTII,S$GLB,, | Performed by: OBSTETRICS & GYNECOLOGY

## 2022-11-03 PROCEDURE — 3074F PR MOST RECENT SYSTOLIC BLOOD PRESSURE < 130 MM HG: ICD-10-PCS | Mod: CPTII,S$GLB,, | Performed by: OBSTETRICS & GYNECOLOGY

## 2022-11-03 PROCEDURE — 99999 PR PBB SHADOW E&M-EST. PATIENT-LVL III: CPT | Mod: PBBFAC,,, | Performed by: OBSTETRICS & GYNECOLOGY

## 2022-11-03 PROCEDURE — 3008F PR BODY MASS INDEX (BMI) DOCUMENTED: ICD-10-PCS | Mod: CPTII,S$GLB,, | Performed by: OBSTETRICS & GYNECOLOGY

## 2022-11-03 PROCEDURE — 1160F PR REVIEW ALL MEDS BY PRESCRIBER/CLIN PHARMACIST DOCUMENTED: ICD-10-PCS | Mod: CPTII,S$GLB,, | Performed by: OBSTETRICS & GYNECOLOGY

## 2022-11-03 PROCEDURE — 3078F PR MOST RECENT DIASTOLIC BLOOD PRESSURE < 80 MM HG: ICD-10-PCS | Mod: CPTII,S$GLB,, | Performed by: OBSTETRICS & GYNECOLOGY

## 2022-11-03 PROCEDURE — 1160F RVW MEDS BY RX/DR IN RCRD: CPT | Mod: CPTII,S$GLB,, | Performed by: OBSTETRICS & GYNECOLOGY

## 2022-11-03 PROCEDURE — 99385 PR PREVENTIVE VISIT,NEW,18-39: ICD-10-PCS | Mod: S$GLB,,, | Performed by: OBSTETRICS & GYNECOLOGY

## 2022-11-03 PROCEDURE — 3078F DIAST BP <80 MM HG: CPT | Mod: CPTII,S$GLB,, | Performed by: OBSTETRICS & GYNECOLOGY

## 2022-11-03 PROCEDURE — 3008F BODY MASS INDEX DOCD: CPT | Mod: CPTII,S$GLB,, | Performed by: OBSTETRICS & GYNECOLOGY

## 2022-11-03 PROCEDURE — 88175 CYTOPATH C/V AUTO FLUID REDO: CPT | Performed by: OBSTETRICS & GYNECOLOGY

## 2022-11-03 NOTE — PROGRESS NOTES
"History & Physical  Gynecology      SUBJECTIVE:     Chief Complaint: Well Woman and Pelvic Pain       History of Present Illness:  Annual Exam-Premenopausal  Patient presents for annual exam. She has complaints today of a "shaking sensation in her pelvis" not brought on by anything in particular, no pain. Menstrual cycles are monthly.  She is sexually active with women. GYN screening history: no prior history of gyn screening tests. She participates in regular exercise: no.  Smoking status:  no    Contraception: not an issue    FH:  Breast cancer: neg  Colon cancer: neg  Ovarian cancer: neg    Review of patient's allergies indicates:  No Known Allergies    Past Medical History:   Diagnosis Date    Anxiety      History reviewed. No pertinent surgical history.  OB History          0    Para   0    Term   0       0    AB   0    Living   0         SAB   0    IAB   0    Ectopic   0    Multiple   0    Live Births   0               Family History   Problem Relation Age of Onset    Hypertension Maternal Grandmother     Diabetes Mother         heart attack 18 yrs, hypertension    Hyperthyroidism Maternal Aunt     Breast cancer Neg Hx     Colon cancer Neg Hx     Ovarian cancer Neg Hx      Social History     Tobacco Use    Smoking status: Never    Smokeless tobacco: Never   Substance Use Topics    Alcohol use: Yes     Comment: occassionally    Drug use: Yes     Types: Marijuana       Current Outpatient Medications   Medication Sig    cetirizine (ZYRTEC) 10 MG tablet Take 1 tablet (10 mg total) by mouth once daily.    clindamycin (CLEOCIN T) 1 % external solution Apply topically 2 (two) times daily. (Patient not taking: Reported on 10/18/2022)    clindamycin phosphate 1% (CLINDAGEL) 1 % gel Apply to face qam. (Patient not taking: Reported on 10/18/2022)    dicyclomine (BENTYL) 20 mg tablet Take 1 tablet (20 mg total) by mouth after meals as needed (abdominal spasms/cramping).    doxycycline monohydrate 100 mg Tab " Take 1 po qday (Patient not taking: Reported on 10/18/2022)    EScitalopram oxalate (LEXAPRO) 10 MG tablet Take 1 tablet (10 mg total) by mouth once daily.    fluticasone (FLONASE) 50 mcg/actuation nasal spray 1 spray (50 mcg total) by Each Nare route 2 (two) times daily as needed. (Patient not taking: Reported on 10/18/2022)    ibuprofen (ADVIL,MOTRIN) 800 MG tablet Take 1 tablet (800 mg total) by mouth 3 (three) times daily as needed for Pain (rib/abdominal pain).    pantoprazole (PROTONIX) 20 MG tablet Take 1 tablet (20 mg total) by mouth once daily.    tazarotene (TAZORAC) 0.05 % Crea cream Apply a pea-sized amt to face qohs x 2 wks then increase to qhs as tolerated.     No current facility-administered medications for this visit.         Review of Systems:  Review of Systems   Constitutional:  Negative for appetite change, fever and unexpected weight change.   Respiratory:  Negative for shortness of breath.    Cardiovascular:  Negative for chest pain.   Gastrointestinal:  Negative for nausea and vomiting.   Genitourinary:  Negative for menorrhagia, menstrual problem, pelvic pain, vaginal bleeding, vaginal discharge and vaginal pain.   Integumentary:  Negative for breast mass.   Breast: Negative for lump and mass     OBJECTIVE:     Physical Exam:  Physical Exam  Vitals and nursing note reviewed.   Constitutional:       Appearance: She is well-developed.   Cardiovascular:      Rate and Rhythm: Normal rate and regular rhythm.      Heart sounds: Normal heart sounds.   Pulmonary:      Effort: Pulmonary effort is normal.      Breath sounds: Normal breath sounds.   Chest:   Breasts:     Breasts are symmetrical.      Right: No inverted nipple, mass, nipple discharge, skin change or tenderness.      Left: No inverted nipple, mass, nipple discharge, skin change or tenderness.   Abdominal:      Palpations: Abdomen is soft.   Genitourinary:     General: Normal vulva.      Labia:         Right: No rash, tenderness, lesion  or injury.         Left: No rash, tenderness, lesion or injury.       Urethra: No prolapse, urethral pain, urethral swelling or urethral lesion.      Vagina: Normal. No signs of injury and foreign body. No vaginal discharge, erythema, tenderness or bleeding.      Cervix: No cervical motion tenderness, discharge or friability.      Uterus: Not deviated, not enlarged, not fixed and not tender.       Adnexa:         Right: No mass, tenderness or fullness.          Left: No mass, tenderness or fullness.        Rectum: No anal fissure or external hemorrhoid.      Comments: Urethral meatus: normal size, anterior vaginal wall with no prolapse, no lesions  Bladder: no fullness, masses or tenderness  Musculoskeletal:      Cervical back: Normal range of motion.   Neurological:      Mental Status: She is alert and oriented to person, place, and time.   Psychiatric:         Behavior: Behavior normal.         Thought Content: Thought content normal.         Judgment: Judgment normal.       Chaperoned by: Zenobia    ASSESSMENT:       ICD-10-CM ICD-9-CM    1. Well woman exam with routine gynecological exam  Z01.419 V72.31 Liquid-Based Pap Smear, Screening      HPV High Risk Genotypes, PCR             Plan:      Paula was seen today for well woman and pelvic pain.    Diagnoses and all orders for this visit:    Well woman exam with routine gynecological exam  -     Liquid-Based Pap Smear, Screening  -     HPV High Risk Genotypes, PCR      Orders Placed This Encounter   Procedures    HPV High Risk Genotypes, PCR       Well Woman:  - Pap smear and hpv today  - Birth control: none needed  - GC/CT:n/a  - Mammogram: due age 42  - Smoking cessation: n/a  - Labs: none required   - Vaccines: recommended HPV vaccine  - Exercise counseling      Follow up in one year for annual or prn.    Liz Hart

## 2023-04-20 ENCOUNTER — OFFICE VISIT (OUTPATIENT)
Dept: URGENT CARE | Facility: CLINIC | Age: 28
End: 2023-04-20
Payer: COMMERCIAL

## 2023-04-20 VITALS
HEIGHT: 60 IN | TEMPERATURE: 99 F | HEART RATE: 74 BPM | WEIGHT: 116 LBS | BODY MASS INDEX: 22.78 KG/M2 | RESPIRATION RATE: 18 BRPM | DIASTOLIC BLOOD PRESSURE: 76 MMHG | OXYGEN SATURATION: 98 % | SYSTOLIC BLOOD PRESSURE: 137 MMHG

## 2023-04-20 DIAGNOSIS — R07.9 CHEST PAIN, UNSPECIFIED TYPE: Primary | ICD-10-CM

## 2023-04-20 DIAGNOSIS — N63.25 BREAST LUMP ON LEFT SIDE AT 12 O'CLOCK POSITION: ICD-10-CM

## 2023-04-20 DIAGNOSIS — N64.4 BREAST TENDERNESS IN FEMALE: ICD-10-CM

## 2023-04-20 PROCEDURE — 93005 EKG 12-LEAD: ICD-10-PCS | Mod: S$GLB,,, | Performed by: INTERNAL MEDICINE

## 2023-04-20 PROCEDURE — 93010 EKG 12-LEAD: ICD-10-PCS | Mod: S$GLB,,, | Performed by: INTERNAL MEDICINE

## 2023-04-20 PROCEDURE — 99214 PR OFFICE/OUTPT VISIT, EST, LEVL IV, 30-39 MIN: ICD-10-PCS | Mod: S$GLB,,, | Performed by: INTERNAL MEDICINE

## 2023-04-20 PROCEDURE — 99214 OFFICE O/P EST MOD 30 MIN: CPT | Mod: S$GLB,,, | Performed by: INTERNAL MEDICINE

## 2023-04-20 PROCEDURE — 93010 ELECTROCARDIOGRAM REPORT: CPT | Mod: S$GLB,,, | Performed by: INTERNAL MEDICINE

## 2023-04-20 PROCEDURE — 93005 ELECTROCARDIOGRAM TRACING: CPT | Mod: S$GLB,,, | Performed by: INTERNAL MEDICINE

## 2023-04-20 RX ORDER — IBUPROFEN 800 MG/1
800 TABLET ORAL 3 TIMES DAILY PRN
Qty: 30 TABLET | Refills: 0 | Status: SHIPPED | OUTPATIENT
Start: 2023-04-20

## 2023-04-20 NOTE — PATIENT INSTRUCTIONS
Imaging center will call you to schedule, please see below for the number for clinic .     If you smoke, please stop smoking. It is very dangerous for your health.     If a referral was placed you may call [phone] to schedule this appointment.     Please remember that you have received care at an urgent care today. Urgent cares are not emergency rooms and are not equipped to handle life threatening emergencies and cannot rule in or out certain medical conditions and you may be released before all of your medical problems are known or treated, please schedule all follow up appointments as discussed and if you have worsening symptoms please go to the ER to rule out potential life threatening problems, as discussed.

## 2023-04-20 NOTE — PROGRESS NOTES
"Subjective:      Patient ID: Paula Campos is a 27 y.o. female.    Vitals:  height is 5' (1.524 m) and weight is 52.6 kg (116 lb). Her temperature is 98.7 °F (37.1 °C). Her blood pressure is 137/76 and her pulse is 74. Her respiration is 18 and oxygen saturation is 98%.     Chief Complaint: Chest Pain    This is a 27 y.o. female   who presents today with a chief complaint of chest discomfort that began a week ago. She describes the pain as sharp underneath her left breast. She states that she's been having pain in both shoulders and around her shoulder blades. She also states that she feels swollen on the left side of her chest. She's been taking gaviscon, advil and aspirin to help relieve her symptoms.     Provider note begins here:    26 yo patient with history of anxiety and GERD who presents for left sided chest pain. Pain started a week ago, seemingly at random. Not associated with exertion or activity. Pain radiates to left arm, left shoulder, right shoulder, left leg and down chest. She feels like the pain is coming from her breast and she has to squeeze her left breast to ease the pain. Also feels like there is alump in the superior portion of her left breast. No SOB. She smoker marijuana, but not recently. Was told mom had "heart attack" while pregnant in her late 20s. No other medical history.     Chest Pain   This is a new problem. The current episode started 1 to 4 weeks ago. The onset quality is gradual. The problem occurs constantly. The problem has been gradually worsening. The pain is present in the lateral region. The pain is at a severity of 6/10. The quality of the pain is described as sharp. The pain radiates to the left shoulder and upper back. Pertinent negatives include no abdominal pain, back pain, claudication, cough, diaphoresis, dizziness, exertional chest pressure, fever, headaches, hemoptysis, irregular heartbeat, leg pain, lower extremity edema, malaise/fatigue, nausea, near-syncope, " numbness, orthopnea, palpitations, PND, shortness of breath, sputum production, syncope, vomiting or weakness. The pain is aggravated by nothing. Treatments tried: gaviscon, advil and aspirin. There are no known risk factors.     Constitution: Negative for sweating and fever.   Cardiovascular:  Positive for chest pain. Negative for palpitations and passing out.   Respiratory:  Negative for cough, sputum production, bloody sputum and shortness of breath.    Gastrointestinal:  Negative for abdominal pain, nausea and vomiting.   Musculoskeletal:  Negative for back pain.   Skin:  Negative for erythema.   Neurological:  Negative for dizziness, headaches and numbness.    Objective:     Physical Exam   Constitutional: She is oriented to person, place, and time. She appears well-developed. No distress.   HENT:   Head: Normocephalic and atraumatic.   Ears:   Right Ear: External ear normal.   Left Ear: External ear normal.   Nose: Nose normal.   Mouth/Throat: Oropharynx is clear and moist. No oropharyngeal exudate.   Eyes: Conjunctivae and EOM are normal. Pupils are equal, round, and reactive to light. Right eye exhibits no discharge. Left eye exhibits no discharge. No scleral icterus.   Neck: Neck supple.   Cardiovascular: Normal rate, regular rhythm and normal heart sounds.   No murmur heard.Exam reveals no gallop and no friction rub.   Pulmonary/Chest: Effort normal. No respiratory distress. She has no wheezes. She has no rales. She exhibits bony tenderness. Left breast exhibits mass and tenderness. There is breast tenderness. No breast bleeding. Breasts are symmetrical.   Possible small lump in superior portion of left breast. Palpation of this area reproduces pain in HPI.              Comments: Possible small lump in superior portion of left breast. Palpation of this area reproduces pain in HPI.     Abdominal: Bowel sounds are normal. She exhibits no distension. Soft.   Genitourinary: There is breast tenderness. No  breast bleeding.   Lymphadenopathy:     She has no cervical adenopathy.   Neurological: She is alert and oriented to person, place, and time.   Skin: Skin is warm, dry, not diaphoretic and no rash. Capillary refill takes less than 2 seconds. No erythema   Psychiatric: Her behavior is normal.   Nursing note and vitals reviewed.      EKG reviewed. NSR, possible right ventricular conduction delay. No evidence of ischemia or stemi  Assessment:     1. Chest pain, unspecified type    2. Breast tenderness in female    3. Breast lump on left side at 12 o'clock position        Plan:       Chest pain, unspecified type  -     IN OFFICE EKG 12-LEAD (to Muse)    Breast tenderness in female  -     US Breast Left Complete; Future; Expected date: 04/20/2023    Breast lump on left side at 12 o'clock position  -     US Breast Left Complete; Future; Expected date: 04/20/2023      Low risk cardiac patient without concerning history. EKG reassuring. Exam with left breast tenderness, possible mass with reproducible pain. Will eval with ultrasound. NSAIDS in the meantime. ED precautions discussed for worsening pain. Discussed limitations of urgent care, but that I thought cardiac or life threatening etiology low probablity and not requiring emergent evaluation at this time.

## 2023-04-23 ENCOUNTER — HOSPITAL ENCOUNTER (EMERGENCY)
Facility: HOSPITAL | Age: 28
Discharge: HOME | End: 2023-04-23
Attending: EMERGENCY MEDICINE
Payer: COMMERCIAL

## 2023-04-23 VITALS
WEIGHT: 120 LBS | BODY MASS INDEX: 24.19 KG/M2 | DIASTOLIC BLOOD PRESSURE: 74 MMHG | TEMPERATURE: 98 F | HEART RATE: 72 BPM | SYSTOLIC BLOOD PRESSURE: 112 MMHG | OXYGEN SATURATION: 98 % | RESPIRATION RATE: 20 BRPM | HEIGHT: 59 IN

## 2023-04-23 DIAGNOSIS — R07.89 CHEST DISCOMFORT: ICD-10-CM

## 2023-04-23 LAB
ALBUMIN SERPL BCP-MCNC: 4.1 G/DL (ref 3.5–5.2)
ALP SERPL-CCNC: 43 U/L (ref 55–135)
ALT SERPL W/O P-5'-P-CCNC: 9 U/L (ref 10–44)
ANION GAP SERPL CALC-SCNC: 7 MMOL/L (ref 8–16)
AST SERPL-CCNC: 19 U/L (ref 10–40)
B-HCG UR QL: NEGATIVE
BASOPHILS # BLD AUTO: 0.04 K/UL (ref 0–0.2)
BASOPHILS NFR BLD: 0.5 % (ref 0–1.9)
BILIRUB SERPL-MCNC: 0.7 MG/DL (ref 0.1–1)
BNP SERPL-MCNC: <10 PG/ML (ref 0–99)
BUN SERPL-MCNC: 13 MG/DL (ref 6–20)
CALCIUM SERPL-MCNC: 9.1 MG/DL (ref 8.7–10.5)
CHLORIDE SERPL-SCNC: 104 MMOL/L (ref 95–110)
CO2 SERPL-SCNC: 26 MMOL/L (ref 23–29)
CREAT SERPL-MCNC: 0.8 MG/DL (ref 0.5–1.4)
CTP QC/QA: YES
DIFFERENTIAL METHOD: ABNORMAL
EOSINOPHIL # BLD AUTO: 0.1 K/UL (ref 0–0.5)
EOSINOPHIL NFR BLD: 0.6 % (ref 0–8)
ERYTHROCYTE [DISTWIDTH] IN BLOOD BY AUTOMATED COUNT: 12.3 % (ref 11.5–14.5)
EST. GFR  (NO RACE VARIABLE): >60 ML/MIN/1.73 M^2
GLUCOSE SERPL-MCNC: 86 MG/DL (ref 70–110)
HCT VFR BLD AUTO: 40.1 % (ref 37–48.5)
HCV AB SERPL QL IA: NORMAL
HGB BLD-MCNC: 13.2 G/DL (ref 12–16)
HIV 1+2 AB+HIV1 P24 AG SERPL QL IA: NORMAL
IMM GRANULOCYTES # BLD AUTO: 0.03 K/UL (ref 0–0.04)
IMM GRANULOCYTES NFR BLD AUTO: 0.4 % (ref 0–0.5)
LYMPHOCYTES # BLD AUTO: 1.3 K/UL (ref 1–4.8)
LYMPHOCYTES NFR BLD: 16.2 % (ref 18–48)
MCH RBC QN AUTO: 31.4 PG (ref 27–31)
MCHC RBC AUTO-ENTMCNC: 32.9 G/DL (ref 32–36)
MCV RBC AUTO: 96 FL (ref 82–98)
MONOCYTES # BLD AUTO: 0.6 K/UL (ref 0.3–1)
MONOCYTES NFR BLD: 8 % (ref 4–15)
NEUTROPHILS # BLD AUTO: 5.8 K/UL (ref 1.8–7.7)
NEUTROPHILS NFR BLD: 74.3 % (ref 38–73)
NRBC BLD-RTO: 0 /100 WBC
PLATELET # BLD AUTO: 263 K/UL (ref 150–450)
PMV BLD AUTO: 10.2 FL (ref 9.2–12.9)
POC CARDIAC TROPONIN I: 0.01 NG/ML (ref 0–0.08)
POTASSIUM SERPL-SCNC: 3.7 MMOL/L (ref 3.5–5.1)
PROT SERPL-MCNC: 7 G/DL (ref 6–8.4)
RBC # BLD AUTO: 4.2 M/UL (ref 4–5.4)
SAMPLE: NORMAL
SODIUM SERPL-SCNC: 137 MMOL/L (ref 136–145)
TROPONIN I SERPL DL<=0.01 NG/ML-MCNC: <0.006 NG/ML (ref 0–0.03)
WBC # BLD AUTO: 7.79 K/UL (ref 3.9–12.7)

## 2023-04-23 PROCEDURE — 93010 ELECTROCARDIOGRAM REPORT: CPT | Mod: ,,, | Performed by: INTERNAL MEDICINE

## 2023-04-23 PROCEDURE — 86803 HEPATITIS C AB TEST: CPT | Performed by: PHYSICIAN ASSISTANT

## 2023-04-23 PROCEDURE — 93005 ELECTROCARDIOGRAM TRACING: CPT

## 2023-04-23 PROCEDURE — 99284 EMERGENCY DEPT VISIT MOD MDM: CPT | Mod: ,,,

## 2023-04-23 PROCEDURE — 85025 COMPLETE CBC W/AUTO DIFF WBC: CPT

## 2023-04-23 PROCEDURE — 83880 ASSAY OF NATRIURETIC PEPTIDE: CPT

## 2023-04-23 PROCEDURE — 99284 PR EMERGENCY DEPT VISIT,LEVEL IV: ICD-10-PCS | Mod: ,,,

## 2023-04-23 PROCEDURE — 94761 N-INVAS EAR/PLS OXIMETRY MLT: CPT

## 2023-04-23 PROCEDURE — 93010 EKG 12-LEAD: ICD-10-PCS | Mod: ,,, | Performed by: INTERNAL MEDICINE

## 2023-04-23 PROCEDURE — 84484 ASSAY OF TROPONIN QUANT: CPT

## 2023-04-23 PROCEDURE — 96374 THER/PROPH/DIAG INJ IV PUSH: CPT

## 2023-04-23 PROCEDURE — 80053 COMPREHEN METABOLIC PANEL: CPT

## 2023-04-23 PROCEDURE — 81025 URINE PREGNANCY TEST: CPT

## 2023-04-23 PROCEDURE — 87389 HIV-1 AG W/HIV-1&-2 AB AG IA: CPT | Performed by: PHYSICIAN ASSISTANT

## 2023-04-23 PROCEDURE — 99285 EMERGENCY DEPT VISIT HI MDM: CPT | Mod: 25

## 2023-04-23 PROCEDURE — 63600175 PHARM REV CODE 636 W HCPCS

## 2023-04-23 RX ORDER — KETOROLAC TROMETHAMINE 30 MG/ML
10 INJECTION, SOLUTION INTRAMUSCULAR; INTRAVENOUS
Status: DISCONTINUED | OUTPATIENT
Start: 2023-04-23 | End: 2023-04-23

## 2023-04-23 RX ORDER — KETOROLAC TROMETHAMINE 30 MG/ML
10 INJECTION, SOLUTION INTRAMUSCULAR; INTRAVENOUS
Status: COMPLETED | OUTPATIENT
Start: 2023-04-23 | End: 2023-04-23

## 2023-04-23 RX ADMIN — KETOROLAC TROMETHAMINE 10 MG: 30 INJECTION, SOLUTION INTRAMUSCULAR; INTRAVENOUS at 04:04

## 2023-04-23 NOTE — DISCHARGE INSTRUCTIONS
As discussed take ibuprofen for pain. Today's cardiac labs were normal x-rays were also normal  Establish care with primary care provider, for follow-up I have listed the [facility] number to call to make appointment.    IF you experience shortness of breath, nausea, vomiting or worsening symptoms you can return for evaluation

## 2023-04-23 NOTE — ED NOTES
Patient identifiers for Paula Campos 27 y.o. female checked and correct.  Past Medical History:   Diagnosis Date    Anxiety      Allergies reported: Review of patient's allergies indicates:  No Known Allergies      LOC: Patient is awake, alert, and aware of environment with an appropriate affect. Patient is oriented x 4 and speaking appropriately.  APPEARANCE: Patient is well appearing and appears stated age. Patient is clean, well groomed, and dressed appropriately for season.   HEENT:   SKIN: The skin is warm and dry. Patient has normal skin turgor and moist mucus membranes.   MUSCULOSKELETAL: Patient is moving all extremities well with no obvious deformities. 3+ pulses palpated in all extremities.   RESPIRATORY: Airway is open and patent. Respirations are spontaneous and non-labored with normal effort and rate.  CARDIAC: Patient has a regular rate and rhythm. No peripheral edema noted.   ABDOMEN: Soft and non-tender upon palpation and non distended.   NEUROLOGICAL: pupils 3 mm, PERRL. Facial expression is symmetrical. Hand grasps are equal bilaterally. Normal sensation in all extremities when touched with finger.

## 2023-04-23 NOTE — ED NOTES
Pt reports chest pain radiating to jaw x 2 week. She describes pain as burning and unrelieved with naproxen. Hx of anxiety, was prescribed Lexapro but ever took medication.

## 2023-04-23 NOTE — ED PROVIDER NOTES
Encounter Date: 4/23/2023       History     Chief Complaint   Patient presents with    Chest Pain     27-year-old female history of anxiety presents to the  emergency department due to 2 weeks of intermittent left-sided chest pain. Pt states the pain appears in a different presentation and location each time. She reports sometimes it is dull, sharp, aches or pressure. She has no known factors that make the symptoms worse.  She denies fever, chills, nausea, vomiting, shortness of breath or abdominal pain.     Review of patient's allergies indicates:  No Known Allergies  Past Medical History:   Diagnosis Date    Anxiety      No past surgical history on file.  Family History   Problem Relation Age of Onset    Hypertension Maternal Grandmother     Diabetes Mother         heart attack 18 yrs, hypertension    Hyperthyroidism Maternal Aunt     Breast cancer Neg Hx     Colon cancer Neg Hx     Ovarian cancer Neg Hx      Social History     Tobacco Use    Smoking status: Never    Smokeless tobacco: Never   Substance Use Topics    Alcohol use: Yes     Comment: occassionally    Drug use: Yes     Types: Marijuana     Review of Systems   Constitutional:  Negative for fever.   Respiratory:  Negative for cough and shortness of breath.    Cardiovascular:  Positive for chest pain. Negative for palpitations and leg swelling.   Gastrointestinal:  Negative for abdominal pain.   Skin:  Negative for color change and pallor.   Allergic/Immunologic: Negative for immunocompromised state.     Physical Exam     Initial Vitals [04/23/23 1413]   BP Pulse Resp Temp SpO2   123/74 88 18 97.7 °F (36.5 °C) 100 %      MAP       --         Physical Exam    Nursing note and vitals reviewed.  Constitutional: She appears well-developed and well-nourished.   HENT:   Head: Normocephalic and atraumatic.   Eyes: Conjunctivae and EOM are normal.   Neck:   Normal range of motion.  Cardiovascular:  Normal rate and regular rhythm.           Pulmonary/Chest: Breath  sounds normal. No respiratory distress. She has no wheezes. She has no rales.   Abdominal: She exhibits no distension.   Musculoskeletal:         General: Normal range of motion.      Cervical back: Normal range of motion.     Neurological: She is alert and oriented to person, place, and time.   Skin: Skin is warm and dry.   Psychiatric: She has a normal mood and affect. Thought content normal.       ED Course   Procedures  Labs Reviewed   CBC W/ AUTO DIFFERENTIAL - Abnormal; Notable for the following components:       Result Value    MCH 31.4 (*)     Gran % 74.3 (*)     Lymph % 16.2 (*)     All other components within normal limits   COMPREHENSIVE METABOLIC PANEL - Abnormal; Notable for the following components:    Alkaline Phosphatase 43 (*)     ALT 9 (*)     Anion Gap 7 (*)     All other components within normal limits   HIV 1 / 2 ANTIBODY    Narrative:     Release to patient->Immediate   HEPATITIS C ANTIBODY    Narrative:     Release to patient->Immediate   TROPONIN I   B-TYPE NATRIURETIC PEPTIDE   TROPONIN ISTAT   POCT URINE PREGNANCY        ECG Results              EKG 12-lead (Final result)  Result time 04/24/23 08:56:50      Final result by Interface, Lab In [facility] (04/24/23 08:56:50)                   Narrative:    Test Reason : R07.89,    Vent. Rate : 069 BPM     Atrial Rate : 069 BPM     P-R Int : 136 ms          QRS Dur : 086 ms      QT Int : 360 ms       P-R-T Axes : 045 063 024 degrees     QTc Int : 385 ms    Normal sinus rhythm  Normal ECG  When compared with ECG of 20-APR-2023 17:53,  No significant change was found  Confirmed by Dirk BOLAND MD ([id number]) on 4/24/2023 8:56:41 AM    Referred By: [email or web address]   SELF           Confirmed By:Dirk BOLAND MD                                  Imaging Results              X-Ray Chest PA And Lateral (Final result)  Result time 04/23/23 16:59:03      Final result by Дмитрий Cardoza MD (04/23/23 16:59:03)                   Impression:      No acute  abnormality.      Electronically signed by: Дмитрий Cardoza MD  Date:    04/23/2023  Time:    16:59               Narrative:    EXAMINATION:  XR CHEST PA AND LATERAL    CLINICAL HISTORY:  Chest Pain;    TECHNIQUE:  PA and lateral views of the chest were performed.    COMPARISON:  Chest radiograph from 07/17/2022    FINDINGS:  The lungs are clear, with normal appearance of pulmonary vasculature and no pleural effusion or pneumothorax.    The cardiac silhouette is normal in size. The hilar and mediastinal contours are unremarkable.    Bones are intact. Bilateral breast jewelry.                                       Medications   ketorolac injection 9.999 mg (9.999 mg Intravenous Given 4/23/23 4528)     Medical Decision Making:   Differential Diagnosis:   Anxiety, muscle spasm, costochondritis low  suspicion of ACS  Clinical Tests:   Lab Tests: Ordered  Radiological Study: Ordered  ED Management:  This is a 27-year-old female presenting to emergency department and the tip home chest pain has been ongoing over the last 2 weeks.  Lab work and chest x-ray were not remarkable for acute process.  Advised patient to try treatment with ibuprofen for symptoms this appears more consistent with muscle spasm or costochondritis.  Patient asked for recommendation for primary care.   Patient discharged home  Pt discussed with supervising physician    Additional MDM:   Heart Score:    History:          Slightly suspicious.  ECG:             Normal  Age:               Less than 45 years  Risk factors: no risk factors known  Troponin:       Less than or equal to normal limit  Final Score: 0         Attending Attestation:     Physician Attestation Statement for NP/PA:   I have conducted a face to face encounter with this patient in addition to the NP/PA, due to NP/PA Request    Other NP/PA Attestation Additions:      Medical Decision Making: Pt is well-appearing, TTP over sternal border and left CW that reproduces painand suspect MSK  "etiology. Pt concerned about ACS bc her mother had "heart attack" post-partum, but no other sudden cardiac events in family and pt has no personal risk factors.     I discussed the patient's care with Advanced Practice Clinician, and did see this patient with the APC.  I reviewed their note and agree with the history, physical, assessment, diagnosis, treatment, and disposition plan provided by the Advanced Practice Clinician.                            Clinical Impression:   Final diagnoses:  [R07.89] Chest discomfort        ED Disposition Condition    Discharge Stable          ED Prescriptions    None       Follow-up Information       Follow up With Specialties Details Why Contact Info Additional Information    [facility] Schedule an appointment as soon as possible for a visit   [address]  [facility] [address] [phone] [facility] Please park in surface lot and check in at central registration desk             Gracia Valle PA-C  04/23/23 5092       Neva Peerz MD  04/24/23 4094    "

## 2023-06-08 ENCOUNTER — OFFICE VISIT (OUTPATIENT)
Dept: PRIMARY CARE CLINIC | Facility: CLINIC | Age: 28
End: 2023-06-08
Payer: COMMERCIAL

## 2023-06-08 ENCOUNTER — HOSPITAL ENCOUNTER (OUTPATIENT)
Dept: RADIOLOGY | Facility: HOSPITAL | Age: 28
Discharge: HOME | End: 2023-06-08
Attending: INTERNAL MEDICINE
Payer: MEDICAID

## 2023-06-08 ENCOUNTER — OFFICE VISIT (OUTPATIENT)
Dept: ORTHOPEDICS | Facility: CLINIC | Age: 28
End: 2023-06-08
Payer: COMMERCIAL

## 2023-06-08 VITALS — BODY MASS INDEX: 25.26 KG/M2 | WEIGHT: 125.31 LBS | HEIGHT: 59 IN

## 2023-06-08 VITALS
OXYGEN SATURATION: 99 % | WEIGHT: 125 LBS | HEIGHT: 59 IN | SYSTOLIC BLOOD PRESSURE: 110 MMHG | BODY MASS INDEX: 25.2 KG/M2 | DIASTOLIC BLOOD PRESSURE: 70 MMHG | HEART RATE: 68 BPM

## 2023-06-08 DIAGNOSIS — M79.609 POPLITEAL PAIN: ICD-10-CM

## 2023-06-08 DIAGNOSIS — M76.899 QUADRICEPS TENDONITIS: Primary | ICD-10-CM

## 2023-06-08 DIAGNOSIS — M76.899 QUADRICEPS TENDONITIS: ICD-10-CM

## 2023-06-08 DIAGNOSIS — M25.562 ACUTE PAIN OF LEFT KNEE: ICD-10-CM

## 2023-06-08 PROCEDURE — 3078F DIAST BP <80 MM HG: CPT | Mod: CPTII,S$GLB,, | Performed by: INTERNAL MEDICINE

## 2023-06-08 PROCEDURE — 93971 EXTREMITY STUDY: CPT | Mod: 26,LT,, | Performed by: RADIOLOGY

## 2023-06-08 PROCEDURE — 99214 PR OFFICE/OUTPT VISIT, EST, LEVL IV, 30-39 MIN: ICD-10-PCS | Mod: S$GLB,,, | Performed by: INTERNAL MEDICINE

## 2023-06-08 PROCEDURE — 3074F PR MOST RECENT SYSTOLIC BLOOD PRESSURE < 130 MM HG: ICD-10-PCS | Mod: CPTII,S$GLB,, | Performed by: INTERNAL MEDICINE

## 2023-06-08 PROCEDURE — 1160F PR REVIEW ALL MEDS BY PRESCRIBER/CLIN PHARMACIST DOCUMENTED: ICD-10-PCS | Mod: CPTII,S$GLB,, | Performed by: PHYSICIAN ASSISTANT

## 2023-06-08 PROCEDURE — 3008F PR BODY MASS INDEX (BMI) DOCUMENTED: ICD-10-PCS | Mod: CPTII,S$GLB,, | Performed by: PHYSICIAN ASSISTANT

## 2023-06-08 PROCEDURE — 73562 X-RAY EXAM OF KNEE 3: CPT | Mod: TC,LT

## 2023-06-08 PROCEDURE — 99999 PR PBB SHADOW E&M-EST. PATIENT-LVL IV: CPT | Mod: PBBFAC,,, | Performed by: INTERNAL MEDICINE

## 2023-06-08 PROCEDURE — 1159F MED LIST DOCD IN RCRD: CPT | Mod: CPTII,S$GLB,, | Performed by: INTERNAL MEDICINE

## 2023-06-08 PROCEDURE — 1159F MED LIST DOCD IN RCRD: CPT | Mod: CPTII,S$GLB,, | Performed by: PHYSICIAN ASSISTANT

## 2023-06-08 PROCEDURE — 1160F RVW MEDS BY RX/DR IN RCRD: CPT | Mod: CPTII,S$GLB,, | Performed by: INTERNAL MEDICINE

## 2023-06-08 PROCEDURE — 3008F PR BODY MASS INDEX (BMI) DOCUMENTED: ICD-10-PCS | Mod: CPTII,S$GLB,, | Performed by: INTERNAL MEDICINE

## 2023-06-08 PROCEDURE — 73562 X-RAY EXAM OF KNEE 3: CPT | Mod: 26,LT,, | Performed by: RADIOLOGY

## 2023-06-08 PROCEDURE — 1160F RVW MEDS BY RX/DR IN RCRD: CPT | Mod: CPTII,S$GLB,, | Performed by: PHYSICIAN ASSISTANT

## 2023-06-08 PROCEDURE — 99203 PR OFFICE/OUTPT VISIT, NEW, LEVL III, 30-44 MIN: ICD-10-PCS | Mod: S$GLB,,, | Performed by: PHYSICIAN ASSISTANT

## 2023-06-08 PROCEDURE — 93971 EXTREMITY STUDY: CPT | Mod: TC,LT

## 2023-06-08 PROCEDURE — 3078F PR MOST RECENT DIASTOLIC BLOOD PRESSURE < 80 MM HG: ICD-10-PCS | Mod: CPTII,S$GLB,, | Performed by: INTERNAL MEDICINE

## 2023-06-08 PROCEDURE — 99214 OFFICE O/P EST MOD 30 MIN: CPT | Mod: S$GLB,,, | Performed by: INTERNAL MEDICINE

## 2023-06-08 PROCEDURE — 99999 PR PBB SHADOW E&M-EST. PATIENT-LVL III: ICD-10-PCS | Mod: PBBFAC,,, | Performed by: PHYSICIAN ASSISTANT

## 2023-06-08 PROCEDURE — 93971 US LOWER EXTREMITY VEINS LEFT: ICD-10-PCS | Mod: 26,LT,, | Performed by: RADIOLOGY

## 2023-06-08 PROCEDURE — 99203 OFFICE O/P NEW LOW 30 MIN: CPT | Mod: S$GLB,,, | Performed by: PHYSICIAN ASSISTANT

## 2023-06-08 PROCEDURE — 99999 PR PBB SHADOW E&M-EST. PATIENT-LVL III: CPT | Mod: PBBFAC,,, | Performed by: PHYSICIAN ASSISTANT

## 2023-06-08 PROCEDURE — 99999 PR PBB SHADOW E&M-EST. PATIENT-LVL IV: ICD-10-PCS | Mod: PBBFAC,,, | Performed by: INTERNAL MEDICINE

## 2023-06-08 PROCEDURE — 1160F PR REVIEW ALL MEDS BY PRESCRIBER/CLIN PHARMACIST DOCUMENTED: ICD-10-PCS | Mod: CPTII,S$GLB,, | Performed by: INTERNAL MEDICINE

## 2023-06-08 PROCEDURE — 3008F BODY MASS INDEX DOCD: CPT | Mod: CPTII,S$GLB,, | Performed by: PHYSICIAN ASSISTANT

## 2023-06-08 PROCEDURE — 1159F PR MEDICATION LIST DOCUMENTED IN MEDICAL RECORD: ICD-10-PCS | Mod: CPTII,S$GLB,, | Performed by: INTERNAL MEDICINE

## 2023-06-08 PROCEDURE — 3074F SYST BP LT 130 MM HG: CPT | Mod: CPTII,S$GLB,, | Performed by: INTERNAL MEDICINE

## 2023-06-08 PROCEDURE — 1159F PR MEDICATION LIST DOCUMENTED IN MEDICAL RECORD: ICD-10-PCS | Mod: CPTII,S$GLB,, | Performed by: PHYSICIAN ASSISTANT

## 2023-06-08 PROCEDURE — 3008F BODY MASS INDEX DOCD: CPT | Mod: CPTII,S$GLB,, | Performed by: INTERNAL MEDICINE

## 2023-06-08 PROCEDURE — 73562 XR KNEE 3 VIEW LEFT: ICD-10-PCS | Mod: 26,LT,, | Performed by: RADIOLOGY

## 2023-06-08 RX ORDER — MELOXICAM 7.5 MG/1
7.5 TABLET ORAL DAILY
Qty: 10 TABLET | Refills: 0 | Status: SHIPPED | OUTPATIENT
Start: 2023-06-08 | End: 2023-06-08

## 2023-06-08 RX ORDER — MELOXICAM 15 MG/1
15 TABLET ORAL DAILY
Qty: 30 TABLET | Refills: 1 | Status: SHIPPED | OUTPATIENT
Start: 2023-06-08 | End: 2023-07-08

## 2023-06-08 NOTE — PROGRESS NOTES
"[facility] Note    Chief Complaint      Chief Complaint   Patient presents with    Leg Pain     Pain start at the knee and goes up to the thigh     History of Present Illness      Paula Campos is a 27 y.o. female who presents today for chief complaint knee pain .   PCP: Fady Schafer MD  Patient comes to appointment alone.     HPI   Knee and thigh pain in her left leg off and on for the last 3 years  It resumed acutely in the last week, pain is not on weight bearing or flexing, more painful at rest there is some edema around her knee  She works from home  She compalins of pain at quadriceps tendon insertion   Last seen 8.2020  [facility]  Active Problem List with Overview Notes    Diagnosis Date Noted    Rib pain on left side 07/22/2022    Esophageal dysphagia 08/20/2020     Was having emesis/regurgitatoin and sensation that something is stuck in throat/chest, now with some throat irritation from emesis   Changed how she was eating to smaller bites which seems to helping, belching, did have some bilious emesis, was having frequent vomiting for 1-2 weeks       Generalized anxiety disorder 08/20/2020     Situational or PTSD component, some is precipitated by stimulus (driving) but other times is unpredictable in settings which have no cause for discomfort.   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 No pertinent surgical history.    family history includes Diabetes in her maternal grandmother and mother; Hypertension in her maternal grandmother; Hyperthyroidism in her maternal aunt.    Social History     Tobacco Use    Smoking status: Never    Smokeless tobacco: Never   Substance Use Topics    Alcohol use: Not Currently     Alcohol/week: 1.0 standard drink     Types: 1 Glasses of wine per week     Comment: occassionally    Drug use: Yes     Types: Marijuana       Review of Systems   HENT:  Negative for hearing loss.    Eyes:  Negative for discharge.   Respiratory:  Negative for wheezing.    Cardiovascular:  Negative for chest pain and palpitations.   Gastrointestinal:  Negative for blood in stool, constipation, diarrhea and vomiting.   Genitourinary:  Negative for dysuria and hematuria.   Musculoskeletal:  Positive for joint pain and myalgias. Negative for neck pain.   Neurological:  Negative for weakness and headaches.   Endo/Heme/Allergies:  Negative for polydipsia.      Outpatient Encounter Medications as of 6/8/2023   Medication Sig Note Dispense Refill    cetirizine (ZYRTEC) 10 MG tablet Take 1 tablet (10 mg total) by mouth once daily.  30 tablet 0    dicyclomine (BENTYL) 20 mg tablet Take 1 tablet (20 mg total) by mouth after meals as needed (abdominal spasms/cramping).  21 tablet 0    EScitalopram oxalate (LEXAPRO) 10 MG tablet Take 1 tablet (10 mg total) by mouth once daily.  30 tablet 11    ibuprofen (ADVIL,MOTRIN) 800 MG tablet Take 1 tablet (800 mg total) by mouth 3 (three) times daily as needed for Pain.  30 tablet 0    meloxicam (MOBIC) 7.5 MG tablet Take 1 tablet (7.5 mg total) by mouth once daily.  10 tablet 0    pantoprazole (PROTONIX) 20 MG tablet Take 1 tablet (20 mg total) by mouth once daily. 7/21/2022: Not taking   90 tablet 1    [DISCONTINUED] clindamycin (CLEOCIN T) 1 % external  "solution Apply topically 2 (two) times daily. (Patient not taking: Reported on 10/18/2022)  30 mL 3    [DISCONTINUED] clindamycin phosphate 1% (CLINDAGEL) 1 % gel Apply to face qam. (Patient not taking: Reported on 10/18/2022)  30 g 2    [DISCONTINUED] doxycycline monohydrate 100 mg Tab Take 1 po qday (Patient not taking: Reported on 10/18/2022)  30 tablet 2    [DISCONTINUED] fluticasone (FLONASE) 50 mcg/actuation nasal spray 1 spray (50 mcg total) by Each Nare route 2 (two) times daily as needed. (Patient not taking: Reported on 10/18/2022)  15 g 0    [DISCONTINUED] tazarotene (TAZORAC) 0.05 % Crea cream Apply a pea-sized amt to face qohs x 2 wks then increase to qhs as tolerated. (Patient not taking: Reported on 4/20/2023)  30 g 2     No facility-administered encounter medications on file as of 6/8/2023.        Review of patient's allergies indicates:  No Known Allergies        Physical Exam      Vital Signs  Pulse: 68  SpO2: 99 %  BP: 110/70  BP Location: Left arm  Patient Position: Sitting  Pain Score: 10-Worst pain ever  Pain Loc: Leg  Height and Weight  Height: 4' 11" (149.9 cm)  Weight: 56.7 kg (125 lb)  BSA (Calculated - sq m): 1.54 sq meters  BMI (Calculated): 25.2  Weight in (lb) to have BMI = 25: 123.5]    Physical Exam  Vitals reviewed.   Constitutional:       General: She is not in acute distress.     Appearance: She is well-developed. She is not diaphoretic.   HENT:      Head: Normocephalic and atraumatic.      Right Ear: External ear normal.      Left Ear: External ear normal.      Nose: Nose normal.   Eyes:      General:         Right eye: No discharge.         Left eye: No discharge.      Conjunctiva/sclera: Conjunctivae normal.   Pulmonary:      Effort: Pulmonary effort is normal. No respiratory distress.   Musculoskeletal:         General: Swelling and tenderness present. Normal range of motion.      Cervical back: Normal range of motion.   Skin:     Coloration: Skin is not pale.      Findings: No " rash.   Neurological:      Mental Status: She is alert and oriented to person, place, and time.   Psychiatric:         Behavior: Behavior normal.         Thought Content: Thought content normal.        Laboratory:  CBC:  Recent Labs   Lab Result Units 04/23/23  1458   WBC K/uL 7.79   RBC M/uL 4.20   Hemoglobin g/dL 13.2   Hematocrit % 40.1   Platelets K/uL 263   MCV fL 96   MCH pg 31.4*   MCHC g/dL 32.9     CMP:  Recent Labs   Lab Result Units 04/23/23  1458   Glucose mg/dL 86   Calcium mg/dL 9.1   Albumin g/dL 4.1   Total Protein g/dL 7.0   Sodium mmol/L 137   Potassium mmol/L 3.7   CO2 mmol/L 26   Chloride mmol/L 104   BUN mg/dL 13   Alkaline Phosphatase U/L 43*   ALT U/L 9*   AST U/L 19   Total Bilirubin mg/dL 0.7     URINALYSIS:  No results for input(s): COLORU, CLARITYU, SPECGRAV, PHUR, PROTEINUA, GLUCOSEU, BILIRUBINCON, BLOODU, WBCU, RBCU, BACTERIA, MUCUS, NITRITE, LEUKOCYTESUR, UROBILINOGEN, HYALINECASTS in the last 2160 hours.   LIPIDS:  No results for input(s): TSH, HDL, CHOL, TRIG, LDLCALC, CHOLHDL, NONHDLCHOL, TOTALCHOLEST in the last 2160 hours.  TSH:  No results for input(s): TSH in the last 2160 hours.  A1C:  No results for input(s): HGBA1C in the last 2160 hours.        Assessment/Plan     Paula Campos is a 27 y.o.female with:    1. Quadriceps tendonitis  -     Ambulatory referral/consult to Orthopedics; Future; Expected date: 06/15/2023  -     meloxicam (MOBIC) 7.5 MG tablet; Take 1 tablet (7.5 mg total) by mouth once daily.  Dispense: 10 tablet; Refill: 0    2. Acute pain of left knee  -     X-Ray Knee 3 View Left; Future; Expected date: 06/08/2023    3. Popliteal pain  -     US Lower Extremity Veins Left; Future; Expected date: 06/08/2023           Future Appointments   Date Time Provider Department Center   6/8/2023 11:00 AM [facility]  XR2 700LB LIMIT [facility] [place]   6/8/2023  3:00 PM Cj Beltran PA-C [facility] ORTHO [facility] gary Ort   8/11/2023 11:00 AM [facility]        Rebecca Dey MD  6/8/2023 10:38 AM    Primary Care Internal Medicine

## 2023-06-09 NOTE — PROGRESS NOTES
SUBJECTIVE:     Chief Complaint & History of Present Illness:  Paula Campos is a New patient 27 y.o. female who is seen here today with a complaint of    Chief Complaint   Patient presents with    Right Knee - Pain    .  Patient is here today for evaluation treatment progressively worsening pain soreness in the right anterior and medial thigh with radiation down to the knee for the past several weeks.  She does not remember a specific trauma or injury to the area but has progressively worsening soreness and pain.  States plays most notable with flexion of the hip, but is decreased with flexion of the knee.  She has not noticed a marked decrease in strength but does have marked increase in pain with resisted movements pain is generalized to the quadriceps region can not be pinpoint to a specific spot.  Was seen earlier today by her primary care prescribed meloxicam 7.5 mg q.d. but has not begun any treatments at this point  On a scale of 1-10, with 10 being worst pain imaginable, he rates this pain as 3 on good days and 8 on bad days.  she describes the pain as tender and sore.    Review of patient's allergies indicates:  No Known Allergies      Current Outpatient Medications   Medication Sig Dispense Refill    EScitalopram oxalate (LEXAPRO) 10 MG tablet Take 1 tablet (10 mg total) by mouth once daily. 30 tablet 11    ibuprofen (ADVIL,MOTRIN) 800 MG tablet Take 1 tablet (800 mg total) by mouth 3 (three) times daily as needed for Pain. 30 tablet 0    cetirizine (ZYRTEC) 10 MG tablet Take 1 tablet (10 mg total) by mouth once daily. 30 tablet 0    dicyclomine (BENTYL) 20 mg tablet Take 1 tablet (20 mg total) by mouth after meals as needed (abdominal spasms/cramping). (Patient not taking: Reported on 6/8/2023) 21 tablet 0    meloxicam (MOBIC) 15 MG tablet Take 1 tablet (15 mg total) by mouth once daily. 30 tablet 1    pantoprazole (PROTONIX) 20 MG tablet Take 1 tablet (20 mg total) by mouth once daily. 90 tablet 1  "    No current facility-administered medications for this visit.       Past Medical History:   Diagnosis Date    Anxiety        History reviewed. No pertinent surgical history.    Vital Signs (Most Recent)  There were no vitals filed for this visit.        Review of Systems:  ROS:  Constitutional: no fever or chills  Eyes: no visual changes  ENT: no nasal congestion or sore throat  Respiratory: no cough or shortness of breath  Cardiovascular: no chest pain or palpitations, positive heart murmur  Gastrointestinal: no nausea or vomiting, tolerating diet, esophageal dysphagia  Genitourinary: no hematuria or dysuria  Integument/Breast: no rash or pruritis  Hematologic/Lymphatic: no easy bruising or lymphadenopathy  Musculoskeletal: no arthralgias or myalgias  Neurological: no seizures or tremors  Behavioral/Psych: no auditory or visual hallucinations, generalized anxiety disorder  Endocrine: no heat or cold intolerance                OBJECTIVE:     PHYSICAL EXAM:  Height: 4' 11" (149.9 cm) Weight: 56.8 kg (125 lb 5.3 oz), General Appearance: Well nourished, well developed, in no acute distress.  Neurological: Mood & affect are normal.    right  Knee Exam:  Knee Range of Motion:0-125 degrees flexion   Effusion:none  Condition of skin:intact  Location of tenderness:  Medial and lateral quadriceps   Strength:5 of 5 quadriceps strength, 5 of 5 gastroc-soleus strength, and 5 of 5 hamstring strength  Stability:  Lachman: stable, LCL: stable, MCL: stable, PCL: stable, and posteromedial (dial): stable  Varus /Valgus stress:  normal  Carmen:   negative/negative    left  Knee Exam:  Knee Range of Motion:0-135 degrees flexion   Effusion:none  Condition of skin:intact  Location of tenderness:None   Strength:5 of 5  Stability:  stable to testing  Varus /Valgus stress:  normal  Carmen:   negative/negative      Hip Examination:  full painless range of motion, without tenderness    RADIOGRAPHS:  X-rays the knees taken today films " reviewed by me demonstrate no evidence of fracture dislocation joint spaces well maintained no advanced degenerative joint disease    ASSESSMENT/PLAN:       ICD-10-CM ICD-9-CM   1. Quadriceps tendonitis  M76.899 727.09       Plan: We discussed with the patient at length all the different treatment options available for  the knee including anti-inflammatories, acetaminophen, rest, ice, knee strengthening exercise, occasional cortisone injections for temporary relief, Viscosupplimentation injections, arthroscopic debridement osteotomy, and finally knee arthroplasty.   Will increase patient's prescription to meloxicam 15 mg q.d. with food times 10 days followed by p.r.n.  Continue stretching exercises as provided by PCP  Avoid high impact high stress exercise x2 weeks  Active range of motion ice rest and elevation  Follow-up in 2 weeks if symptoms not significantly improved consider more aggressive therapy options

## 2023-06-13 ENCOUNTER — PATIENT MESSAGE (OUTPATIENT)
Dept: RESEARCH | Facility: HOSPITAL | Age: 28
End: 2023-06-13
Payer: COMMERCIAL

## 2023-06-20 ENCOUNTER — PATIENT MESSAGE (OUTPATIENT)
Dept: RESEARCH | Facility: HOSPITAL | Age: 28
End: 2023-06-20
Payer: COMMERCIAL

## 2023-06-23 ENCOUNTER — TELEPHONE (OUTPATIENT)
Dept: ORTHOPEDICS | Facility: CLINIC | Age: 28
End: 2023-06-23
Payer: COMMERCIAL

## 2023-06-23 NOTE — TELEPHONE ENCOUNTER
----- Message from Teri Foss sent at 6/23/2023 11:31 AM CDT -----  Regarding: pt advice  Contact: [phone]  Paula Campos calling regarding Patient Advice (message) for #meloxicam (MOBIC) 15 MG tablet. She would like to know if she can still take it until her appt on 6.28. Please call

## 2023-06-27 ENCOUNTER — PATIENT MESSAGE (OUTPATIENT)
Dept: RESEARCH | Facility: HOSPITAL | Age: 28
End: 2023-06-27
Payer: COMMERCIAL

## 2023-06-28 ENCOUNTER — OFFICE VISIT (OUTPATIENT)
Dept: ORTHOPEDICS | Facility: CLINIC | Age: 28
End: 2023-06-28
Payer: COMMERCIAL

## 2023-06-28 VITALS — BODY MASS INDEX: 26.13 KG/M2 | WEIGHT: 129.63 LBS | HEIGHT: 59 IN

## 2023-06-28 DIAGNOSIS — M76.899 QUADRICEPS TENDONITIS: Primary | ICD-10-CM

## 2023-06-28 PROCEDURE — 99499 UNLISTED E&M SERVICE: CPT | Mod: S$GLB,,, | Performed by: PHYSICIAN ASSISTANT

## 2023-06-28 PROCEDURE — 99999 PR PBB SHADOW E&M-EST. PATIENT-LVL III: ICD-10-PCS | Mod: PBBFAC,,, | Performed by: PHYSICIAN ASSISTANT

## 2023-06-28 PROCEDURE — 99999 PR PBB SHADOW E&M-EST. PATIENT-LVL III: CPT | Mod: PBBFAC,,, | Performed by: PHYSICIAN ASSISTANT

## 2023-06-28 PROCEDURE — 99499 NO LOS: ICD-10-PCS | Mod: S$GLB,,, | Performed by: PHYSICIAN ASSISTANT

## 2023-07-03 ENCOUNTER — OFFICE VISIT (OUTPATIENT)
Dept: SPORTS MEDICINE | Facility: CLINIC | Age: 28
End: 2023-07-03
Payer: MEDICAID

## 2023-07-03 ENCOUNTER — HOSPITAL ENCOUNTER (OUTPATIENT)
Dept: RADIOLOGY | Facility: HOSPITAL | Age: 28
Discharge: HOME | End: 2023-07-03
Attending: ORTHOPAEDIC SURGERY
Payer: MEDICAID

## 2023-07-03 ENCOUNTER — TELEPHONE (OUTPATIENT)
Dept: NEUROLOGY | Facility: CLINIC | Age: 28
End: 2023-07-03
Payer: MEDICAID

## 2023-07-03 VITALS
SYSTOLIC BLOOD PRESSURE: 110 MMHG | WEIGHT: 126 LBS | BODY MASS INDEX: 25.4 KG/M2 | HEART RATE: 70 BPM | DIASTOLIC BLOOD PRESSURE: 71 MMHG | HEIGHT: 59 IN

## 2023-07-03 DIAGNOSIS — M54.16 LUMBAR RADICULOPATHY: Primary | ICD-10-CM

## 2023-07-03 DIAGNOSIS — M54.16 LUMBAR RADICULOPATHY: ICD-10-CM

## 2023-07-03 DIAGNOSIS — M25.562 LEFT KNEE PAIN, UNSPECIFIED CHRONICITY: ICD-10-CM

## 2023-07-03 PROCEDURE — 72114 XR LUMBAR SPINE 5 VIEW WITH FLEX AND EXT: ICD-10-PCS | Mod: 26,,, | Performed by: RADIOLOGY

## 2023-07-03 PROCEDURE — 99213 OFFICE O/P EST LOW 20 MIN: CPT | Mod: PBBFAC | Performed by: ORTHOPAEDIC SURGERY

## 2023-07-03 PROCEDURE — 72114 X-RAY EXAM L-S SPINE BENDING: CPT | Mod: TC

## 2023-07-03 PROCEDURE — 72114 X-RAY EXAM L-S SPINE BENDING: CPT | Mod: 26,,, | Performed by: RADIOLOGY

## 2023-07-03 PROCEDURE — 99999 PR PBB SHADOW E&M-EST. PATIENT-LVL III: ICD-10-PCS | Mod: PBBFAC,,, | Performed by: ORTHOPAEDIC SURGERY

## 2023-07-03 PROCEDURE — 99999 PR PBB SHADOW E&M-EST. PATIENT-LVL III: CPT | Mod: PBBFAC,,, | Performed by: ORTHOPAEDIC SURGERY

## 2023-07-03 PROCEDURE — 99204 OFFICE O/P NEW MOD 45 MIN: CPT | Mod: S$PBB,,, | Performed by: ORTHOPAEDIC SURGERY

## 2023-07-03 PROCEDURE — 99204 PR OFFICE/OUTPT VISIT, NEW, LEVL IV, 45-59 MIN: ICD-10-PCS | Mod: S$PBB,,, | Performed by: ORTHOPAEDIC SURGERY

## 2023-07-03 RX ORDER — METHYLPREDNISOLONE 4 MG/1
TABLET ORAL
Qty: 21 EACH | Refills: 0 | Status: SHIPPED | OUTPATIENT
Start: 2023-07-03 | End: 2023-07-24

## 2023-07-03 NOTE — TELEPHONE ENCOUNTER
LVM for pt to contact us back.   ----- Message from Shelby Patel MA sent at 7/3/2023  1:30 PM CDT -----  Regarding: FW: pt call back    ----- Message -----  From: Luz Ozuna  Sent: 7/3/2023   1:18 PM CDT  To: [facility] Staff  Subject: pt call back                                     Patient Returning Call Who Called: RALPH STILL [[id number]]      Who Left Message for Patient: Lucretia Rose MA         Does the patient know what this is regarding? pt is returning call, would like a call back, please advise.       Best Call Back Number: [phone]        Additional Information
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
## 2023-08-01 ENCOUNTER — HOSPITAL ENCOUNTER (OUTPATIENT)
Dept: RADIOLOGY | Facility: HOSPITAL | Age: 28
Discharge: HOME | End: 2023-08-01
Attending: ORTHOPAEDIC SURGERY
Payer: MEDICAID

## 2023-08-01 DIAGNOSIS — M54.16 LUMBAR RADICULOPATHY: ICD-10-CM

## 2023-08-01 PROCEDURE — 72148 MRI LUMBAR SPINE W/O DYE: CPT | Mod: 26,,, | Performed by: RADIOLOGY

## 2023-08-01 PROCEDURE — 72148 MRI LUMBAR SPINE W/O DYE: CPT | Mod: TC

## 2023-08-01 PROCEDURE — 72148 MRI LUMBAR SPINE WITHOUT CONTRAST: ICD-10-PCS | Mod: 26,,, | Performed by: RADIOLOGY

## 2023-08-02 ENCOUNTER — OFFICE VISIT (OUTPATIENT)
Dept: SPORTS MEDICINE | Facility: CLINIC | Age: 28
End: 2023-08-02
Payer: MEDICAID

## 2023-08-02 VITALS
BODY MASS INDEX: 25.6 KG/M2 | WEIGHT: 126.75 LBS | HEART RATE: 81 BPM | SYSTOLIC BLOOD PRESSURE: 101 MMHG | DIASTOLIC BLOOD PRESSURE: 66 MMHG

## 2023-08-02 DIAGNOSIS — M54.16 LUMBAR RADICULOPATHY: Primary | ICD-10-CM

## 2023-08-02 PROCEDURE — 3074F SYST BP LT 130 MM HG: CPT | Mod: CPTII,,, | Performed by: ORTHOPAEDIC SURGERY

## 2023-08-02 PROCEDURE — 99999 PR PBB SHADOW E&M-EST. PATIENT-LVL III: CPT | Mod: PBBFAC,,, | Performed by: ORTHOPAEDIC SURGERY

## 2023-08-02 PROCEDURE — 99214 PR OFFICE/OUTPT VISIT, EST, LEVL IV, 30-39 MIN: ICD-10-PCS | Mod: S$PBB,,, | Performed by: ORTHOPAEDIC SURGERY

## 2023-08-02 PROCEDURE — 3078F PR MOST RECENT DIASTOLIC BLOOD PRESSURE < 80 MM HG: ICD-10-PCS | Mod: CPTII,,, | Performed by: ORTHOPAEDIC SURGERY

## 2023-08-02 PROCEDURE — 99999 PR PBB SHADOW E&M-EST. PATIENT-LVL III: ICD-10-PCS | Mod: PBBFAC,,, | Performed by: ORTHOPAEDIC SURGERY

## 2023-08-02 PROCEDURE — 3008F PR BODY MASS INDEX (BMI) DOCUMENTED: ICD-10-PCS | Mod: CPTII,,, | Performed by: ORTHOPAEDIC SURGERY

## 2023-08-02 PROCEDURE — 99213 OFFICE O/P EST LOW 20 MIN: CPT | Mod: PBBFAC | Performed by: ORTHOPAEDIC SURGERY

## 2023-08-02 PROCEDURE — 1159F MED LIST DOCD IN RCRD: CPT | Mod: CPTII,,, | Performed by: ORTHOPAEDIC SURGERY

## 2023-08-02 PROCEDURE — 99214 OFFICE O/P EST MOD 30 MIN: CPT | Mod: S$PBB,,, | Performed by: ORTHOPAEDIC SURGERY

## 2023-08-02 PROCEDURE — 3074F PR MOST RECENT SYSTOLIC BLOOD PRESSURE < 130 MM HG: ICD-10-PCS | Mod: CPTII,,, | Performed by: ORTHOPAEDIC SURGERY

## 2023-08-02 PROCEDURE — 3078F DIAST BP <80 MM HG: CPT | Mod: CPTII,,, | Performed by: ORTHOPAEDIC SURGERY

## 2023-08-02 PROCEDURE — 3008F BODY MASS INDEX DOCD: CPT | Mod: CPTII,,, | Performed by: ORTHOPAEDIC SURGERY

## 2023-08-02 PROCEDURE — 1159F PR MEDICATION LIST DOCUMENTED IN MEDICAL RECORD: ICD-10-PCS | Mod: CPTII,,, | Performed by: ORTHOPAEDIC SURGERY
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
## 2024-07-15 ENCOUNTER — HOSPITAL ENCOUNTER (EMERGENCY)
Facility: HOSPITAL | Age: 29
Discharge: HOME | End: 2024-07-15
Attending: EMERGENCY MEDICINE
Payer: MEDICAID

## 2024-07-15 VITALS
SYSTOLIC BLOOD PRESSURE: 122 MMHG | BODY MASS INDEX: 25.32 KG/M2 | WEIGHT: 129 LBS | RESPIRATION RATE: 18 BRPM | TEMPERATURE: 98 F | HEIGHT: 60 IN | HEART RATE: 78 BPM | DIASTOLIC BLOOD PRESSURE: 71 MMHG | OXYGEN SATURATION: 98 %

## 2024-07-15 DIAGNOSIS — R51.9 NONINTRACTABLE HEADACHE, UNSPECIFIED CHRONICITY PATTERN, UNSPECIFIED HEADACHE TYPE: Primary | ICD-10-CM

## 2024-07-15 LAB
B-HCG UR QL: NEGATIVE
CTP QC/QA: YES

## 2024-07-15 PROCEDURE — 99284 EMERGENCY DEPT VISIT MOD MDM: CPT | Mod: 25

## 2024-07-15 PROCEDURE — 81025 URINE PREGNANCY TEST: CPT | Performed by: EMERGENCY MEDICINE

## 2024-07-15 NOTE — ED PROVIDER NOTES
Encounter Date: 7/15/2024       History     Chief Complaint   Patient presents with    Headache     Moving pressure sensation to my head 2 weeks ago, denies injury     28-year-old female presents with a moving pressure sensation to her head for the past 2 weeks.  She actually 1st noticed this about 2 years ago but it is more frequent over the last several weeks.  Sometimes it has in the front of her head around the forehead.  Sometimes it has in the occiput.  There has no abrupt sudden onset of a severe headache.  It has more like a moving pressure.  No neck stiffness.  No change in her vision.  No fever or vomiting.        Review of patient's allergies indicates:  No Known Allergies  Past Medical History:   Diagnosis Date    Anxiety      History reviewed. No pertinent surgical history.  Family History   Problem Relation Name Age of Onset    Hypertension Maternal Grandmother Arash Starks     Diabetes Maternal Grandmother Arash Starks     Diabetes Mother          heart attack 18 yrs, hypertension    Hyperthyroidism Maternal Aunt      Breast cancer Neg Hx      Colon cancer Neg Hx      Ovarian cancer Neg Hx       Social History     Tobacco Use    Smoking status: Never    Smokeless tobacco: Never   Substance Use Topics    Alcohol use: Not Currently     Alcohol/week: 1.0 standard drink of alcohol     Types: 1 Glasses of wine per week     Comment: occassionally    Drug use: Yes     Types: Marijuana     Review of Systems    Physical Exam     Initial Vitals [07/15/24 1349]   BP Pulse Resp Temp SpO2   117/80 82 18 98.6 °F (37 °C) 98 %      MAP       --         Physical Exam    Constitutional: She appears well-developed and well-nourished. No distress.   HENT:   Head: Normocephalic and atraumatic.   Eyes: EOM are normal. Pupils are equal, round, and reactive to light.   Neck: Neck supple.   Normal range of motion.  Cardiovascular:  Normal rate, regular rhythm and normal heart sounds.           Pulmonary/Chest: Breath sounds  normal. No respiratory distress. She has no wheezes.   Abdominal: Abdomen is soft. Bowel sounds are normal. She exhibits no distension. There is no abdominal tenderness.   Musculoskeletal:      Cervical back: Normal range of motion and neck supple.     Neurological: She is alert. She has normal strength. No cranial nerve deficit or sensory deficit. GCS score is 15. GCS eye subscore is 4. GCS verbal subscore is 5. GCS motor subscore is 6.   Psychiatric: She has a normal mood and affect.         ED Course   Procedures  Labs Reviewed   POCT URINE PREGNANCY          Imaging Results              CT Head Without Contrast (Final result)  Result time 07/15/24 16:08:44      Final result by Solitario Madrid MD (07/15/24 16:08:44)                   Impression:      No evidence of acute hemorrhage or major vascular distribution infarct.      Electronically signed by: Solitario Madrid MD  Date:    07/15/2024  Time:    16:08               Narrative:    EXAMINATION:  CT HEAD WITHOUT CONTRAST    CLINICAL HISTORY:  Headache, chronic, new features or increased frequency;    TECHNIQUE:  Low dose axial CT images obtained throughout the head without the use of intravenous contrast.  Axial, sagittal and coronal reconstructions were performed.    COMPARISON:  None.    FINDINGS:  Intracranial compartment:    Ventricles and sulci are normal in size for age without evidence of hydrocephalus.    The brain parenchyma appears within normal limits.  No parenchymal  hemorrhage, edema, mass effect or major vascular distribution infarct.    No extra-axial blood or fluid collections.    Skull/extracranial contents (limited evaluation):    No displaced calvarial fracture.    The mastoid air cells and visualized paranasal sinuses are essentially clear.                                       Medications - No data to display  Medical Decision Making  Patient with headache.  Head CT was negative.  Doubt subarachnoid hemorrhage meningitis in his cerebral  hemorrhage stroke.  Will discharge home in stable condition.    Amount and/or Complexity of Data Reviewed  Labs: ordered.  Radiology: ordered.                                      Clinical Impression:  Final diagnoses:  [R51.9] Nonintractable headache, unspecified chronicity pattern, unspecified headache type (Primary)          ED Disposition Condition    Discharge Stable          ED Prescriptions    None       Follow-up Information       Follow up With Specialties Details Why Contact Info Additional Information    [facility] Internal Medicine Schedule an appointment as soon as possible for a visit in 2 days For Primary Care and Emergency Department Follow-up [address]  [facility] [id number] [phone] [facility] Please park in surface lot and check in at central registration desk    [facility] Emergency Medicine  If symptoms worsen [address]  [facility] [id number] [phone]              Lenny Calderon MD  07/15/24 [id number]

## 2024-07-15 NOTE — ED TRIAGE NOTES
"Pt presents to the ED complaining of headache, ear itchiness, and a "whooshing" sound that developed over the past 2 weeks. Pt states that the ear issues originally began with itchiness in her L ear, however, it soon progressed to pain that radiated down to her neck. The same eventually happened to the other ear and now she reports a "bump" in her inner ear. Pt also complains of her head pounding and a "strange whooshing sound".   "